# Patient Record
Sex: FEMALE | Race: WHITE | HISPANIC OR LATINO | Employment: FULL TIME | URBAN - METROPOLITAN AREA
[De-identification: names, ages, dates, MRNs, and addresses within clinical notes are randomized per-mention and may not be internally consistent; named-entity substitution may affect disease eponyms.]

---

## 2022-09-15 LAB — PAP SMEAR - HIM PATIENT REPORTED: NORMAL

## 2023-10-20 ENCOUNTER — TRANSFERRED RECORDS (OUTPATIENT)
Dept: MULTI SPECIALTY CLINIC | Facility: CLINIC | Age: 23
End: 2023-10-20

## 2023-10-20 LAB — CHLAMYDIA - HIM PATIENT REPORTED: NORMAL

## 2024-02-28 ENCOUNTER — OFFICE VISIT (OUTPATIENT)
Dept: FAMILY MEDICINE | Facility: CLINIC | Age: 24
End: 2024-02-28
Payer: COMMERCIAL

## 2024-02-28 VITALS
BODY MASS INDEX: 20.83 KG/M2 | SYSTOLIC BLOOD PRESSURE: 111 MMHG | RESPIRATION RATE: 16 BRPM | HEART RATE: 84 BPM | OXYGEN SATURATION: 100 % | DIASTOLIC BLOOD PRESSURE: 76 MMHG | WEIGHT: 125 LBS | HEIGHT: 65 IN | TEMPERATURE: 98.2 F

## 2024-02-28 DIAGNOSIS — Z86.19 HISTORY OF CHLAMYDIA: ICD-10-CM

## 2024-02-28 DIAGNOSIS — N89.8 VAGINAL DISCHARGE: ICD-10-CM

## 2024-02-28 DIAGNOSIS — Z11.3 SCREEN FOR STD (SEXUALLY TRANSMITTED DISEASE): ICD-10-CM

## 2024-02-28 DIAGNOSIS — Z11.59 NEED FOR HEPATITIS C SCREENING TEST: ICD-10-CM

## 2024-02-28 DIAGNOSIS — B96.89 BACTERIAL VAGINOSIS: ICD-10-CM

## 2024-02-28 DIAGNOSIS — Z00.00 ROUTINE GENERAL MEDICAL EXAMINATION AT A HEALTH CARE FACILITY: Primary | ICD-10-CM

## 2024-02-28 DIAGNOSIS — N76.0 BACTERIAL VAGINOSIS: ICD-10-CM

## 2024-02-28 LAB
CLUE CELLS: PRESENT
T PALLIDUM AB SER QL: NONREACTIVE
TRICHOMONAS, WET PREP: ABNORMAL
WBC'S/HIGH POWER FIELD, WET PREP: ABNORMAL
YEAST, WET PREP: ABNORMAL

## 2024-02-28 PROCEDURE — 99385 PREV VISIT NEW AGE 18-39: CPT | Performed by: PHYSICIAN ASSISTANT

## 2024-02-28 PROCEDURE — 99213 OFFICE O/P EST LOW 20 MIN: CPT | Mod: 25 | Performed by: PHYSICIAN ASSISTANT

## 2024-02-28 PROCEDURE — 86780 TREPONEMA PALLIDUM: CPT | Performed by: PHYSICIAN ASSISTANT

## 2024-02-28 PROCEDURE — 87210 SMEAR WET MOUNT SALINE/INK: CPT | Performed by: PHYSICIAN ASSISTANT

## 2024-02-28 PROCEDURE — 86803 HEPATITIS C AB TEST: CPT | Performed by: PHYSICIAN ASSISTANT

## 2024-02-28 PROCEDURE — 87591 N.GONORRHOEAE DNA AMP PROB: CPT | Performed by: PHYSICIAN ASSISTANT

## 2024-02-28 PROCEDURE — 87491 CHLMYD TRACH DNA AMP PROBE: CPT | Performed by: PHYSICIAN ASSISTANT

## 2024-02-28 PROCEDURE — 87389 HIV-1 AG W/HIV-1&-2 AB AG IA: CPT | Performed by: PHYSICIAN ASSISTANT

## 2024-02-28 PROCEDURE — 36415 COLL VENOUS BLD VENIPUNCTURE: CPT | Performed by: PHYSICIAN ASSISTANT

## 2024-02-28 RX ORDER — METRONIDAZOLE 7.5 MG/G
GEL VAGINAL
Qty: 25 G | Refills: 0 | Status: SHIPPED | OUTPATIENT
Start: 2024-02-28 | End: 2024-03-13

## 2024-02-28 SDOH — HEALTH STABILITY: PHYSICAL HEALTH: ON AVERAGE, HOW MANY DAYS PER WEEK DO YOU ENGAGE IN MODERATE TO STRENUOUS EXERCISE (LIKE A BRISK WALK)?: 3 DAYS

## 2024-02-28 ASSESSMENT — PAIN SCALES - GENERAL: PAINLEVEL: NO PAIN (0)

## 2024-02-28 ASSESSMENT — SOCIAL DETERMINANTS OF HEALTH (SDOH): HOW OFTEN DO YOU GET TOGETHER WITH FRIENDS OR RELATIVES?: THREE TIMES A WEEK

## 2024-02-28 NOTE — PATIENT INSTRUCTIONS
Preventive Care Advice   This is general advice given by our system to help you stay healthy. However, your care team may have specific advice just for you. Please talk to your care team about your preventive care needs.  Nutrition  Eat 5 or more servings of fruits and vegetables each day.  Try wheat bread, brown rice and whole grain pasta (instead of white bread, rice, and pasta).  Get enough calcium and vitamin D. Check the label on foods and aim for 100% of the RDA (recommended daily allowance).  Lifestyle  Exercise at least 150 minutes each week   (30 minutes a day, 5 days a week).  Do muscle strengthening activities 2 days a week. These help control your weight and prevent disease.  No smoking.  Wear sunscreen to prevent skin cancer.  Have a dental exam and cleaning every 6 months.  Yearly exams  See your health care team every year to talk about:  Any changes in your health.  Any medicines your care team has prescribed.  Preventive care, family planning, and ways to prevent chronic diseases.  Shots (vaccines)   HPV shots (up to age 26), if you've never had them before.  Hepatitis B shots (up to age 59), if you've never had them before.  COVID-19 shot: Get this shot when it's due.  Flu shot: Get a flu shot every year.  Tetanus shot: Get a tetanus shot every 10 years.  Pneumococcal, hepatitis A, and RSV shots: Ask your care team if you need these based on your risk.  Shingles shot (for age 50 and up).  General health tests  Diabetes screening:  Starting at age 35, Get screened for diabetes at least every 3 years.  If you are younger than age 35, ask your care team if you should be screened for diabetes.  Cholesterol test: At age 39, start having a cholesterol test every 5 years, or more often if advised.  Bone density scan (DEXA): At age 50, ask your care team if you should have this scan for osteoporosis (brittle bones).  Hepatitis C: Get tested at least once in your life.  STIs (sexually transmitted  infections)  Before age 24: Ask your care team if you should be screened for STIs.  After age 24: Get screened for STIs if you're at risk. You are at risk for STIs (including HIV) if:  You are sexually active with more than one person.  You don't use condoms every time.  You or a partner was diagnosed with a sexually transmitted infection.  If you are at risk for HIV, ask about PrEP medicine to prevent HIV.  Get tested for HIV at least once in your life, whether you are at risk for HIV or not.  Cancer screening tests  Cervical cancer screening: If you have a cervix, begin getting regular cervical cancer screening tests at age 21. Most people who have regular screenings with normal results can stop after age 65. Talk about this with your provider.  Breast cancer scan (mammogram): If you've ever had breasts, begin having regular mammograms starting at age 40. This is a scan to check for breast cancer.  Colon cancer screening: It is important to start screening for colon cancer at age 45.  Have a colonoscopy test every 10 years (or more often if you're at risk) Or, ask your provider about stool tests like a FIT test every year or Cologuard test every 3 years.  To learn more about your testing options, visit: https://www.Corpsolv/599176.pdf.  For help making a decision, visit: https://bit.ly/jq74549.  Prostate cancer screening test: If you have a prostate and are age 55 to 69, ask your provider if you would benefit from a yearly prostate cancer screening test.  Lung cancer screening: If you are a current or former smoker age 50 to 80, ask your care team if ongoing lung cancer screenings are right for you.  For informational purposes only. Not to replace the advice of your health care provider. Copyright   2023 [employer]. All rights reserved. Clinically reviewed by the [facility] Transitions Program. LitRes 574536 - REV 01/24.

## 2024-02-28 NOTE — PROGRESS NOTES
Preventive Care Visit  [facility]  WILVER East, Physician Assistant - Medical  Feb 28, 2024    Assessment & Plan     Routine general medical examination at a health care facility  Repeat 1 year     Vaginal discharge  - Wet prep - Clinic Collect  - Chlamydia trachomatis/Neisseria gonorrhoeae by PCR - Clinic Collect    Screen for STD (sexually transmitted disease)  - HIV Antigen Antibody Combo; Future  - Treponema Abs w Reflex to RPR and Titer; Future  - Chlamydia trachomatis/Neisseria gonorrhoeae by PCR - Clinic Collect  - HIV Antigen Antibody Combo  - Treponema Abs w Reflex to RPR and Titer    Need for hepatitis C screening test  - Hepatitis C Screen Reflex to HCV RNA Quant and Genotype; Future  - Hepatitis C Screen Reflex to HCV RNA Quant and Genotype    History of chlamydia  Rechecking today, has been treated twice in the last 6 months    Bacterial vaginosis  Wet prep positive for clue cells indicating bacterial vaginosis.  Medication sent to pharmacy.  Follow-up as needed  - metroNIDAZOLE (METROGEL) 0.75 % vaginal gel; Place one applicator in vagina at bedtime for 5 nights    Patient has been advised of split billing requirements and indicates understanding: Yes          Counseling  Appropriate preventive services were discussed with this patient, including applicable screening as appropriate for fall prevention, nutrition, physical activity, Tobacco-use cessation, weight loss and cognition.  Checklist reviewing preventive services available has been given to the patient.  Reviewed patient's diet, addressing concerns and/or questions.   She is at risk for lack of exercise and has been provided with information to increase physical activity for the benefit of her well-being.           Ilya Mancuso is a 23 year old, presenting for the following:  Physical (/)       Via the Health Maintenance questionnaire, the patient has reported the following services have been  completed -Chlamydia-Cervical Cancer Screening, this information has been sent to the abstraction team.  Health Care Directive  Patient does not have a Health Care Directive or Living Will: Discussed advance care planning with patient; however, patient declined at this time.    HPI    Concern - Vaginal   Description: Yeast/BV, STD testing.   Oct was positive for chlamydia. She was treated and retested in December which was normal. Then she had symptoms at the end of the December was positive again and was treated a second time for chlamydia.  Also over this timeframe she has noticed she has gotten yeast infections after completing treatment for chlamydia.  She still has some slight discomfort in the vaginal area.  She has more discharge than normal.  No abnormal odor or pain or bleeding.  Patient's last menstrual period was 02/20/2024 (approximate).  She usually does use condoms.          2/28/2024   General Health   How would you rate your overall physical health? Excellent   Feel stress (tense, anxious, or unable to sleep) Not at all         2/28/2024   Nutrition   Three or more servings of calcium each day? Yes   Diet: Regular (no restrictions)   How many servings of fruit and vegetables per day? (!) 0-1   How many sweetened beverages each day? 0-1         2/28/2024   Exercise   Days per week of moderate/strenous exercise 3 days         2/28/2024   Social Factors   Frequency of gathering with friends or relatives Three times a week   Worry food won't last until get money to buy more No   Food not last or not have enough money for food? No   Do you have housing?  Yes   Are you worried about losing your housing? No   Lack of transportation? No   Unable to get utilities (heat,electricity)? No         2/28/2024   Dental   Dentist two times every year? Yes         2/28/2024   TB Screening   Were you born outside of [place]?  No         Today's PHQ-2 Score:       2/28/2024     2:05 PM   PHQ-2 ( 1999 [employer])   Q1: Little  "interest or pleasure in doing things 0   Q2: Feeling down, depressed or hopeless 0   PHQ-2 Score 0   Q1: Little interest or pleasure in doing things Not at all   Q2: Feeling down, depressed or hopeless Not at all   PHQ-2 Score 0           2/28/2024   Substance Use   Alcohol more than 3/day or more than 7/wk No   Do you use any other substances recreationally? No     Social History     Tobacco Use    Smoking status: Never     Passive exposure: Never    Smokeless tobacco: Never   Vaping Use    Vaping Use: Never used   Substance Use Topics    Alcohol use: Never    Drug use: Never             2/28/2024   One time HIV Screening   Previous HIV test? Yes         2/28/2024   STI Screening   New sexual partner(s) since last STI/HIV test? No     History of abnormal Pap smear: NO - age 21-29 PAP every 3 years recommended             2/28/2024   Contraception/Family Planning   Questions about contraception or family planning No        Reviewed and updated as needed this visit by Provider                          Review of Systems  Constitutional, HEENT, cardiovascular, pulmonary, GI, , musculoskeletal, neuro, skin, endocrine and psych systems are negative, except as otherwise noted.     Objective    Exam  Temp 98.2  F (36.8  C) (Oral)   Resp 16   Ht 1.645 m (5' 4.76\")   Wt 56.7 kg (125 lb)   LMP 02/20/2024 (Approximate)   BMI 20.95 kg/m     Estimated body mass index is 20.95 kg/m  as calculated from the following:    Height as of this encounter: 1.645 m (5' 4.76\").    Weight as of this encounter: 56.7 kg (125 lb).    Physical Exam  GENERAL: alert and no distress  EYES: Eyes grossly normal to inspection, PERRL and conjunctivae and sclerae normal  HENT: ear canals and TM's normal, nose and mouth without ulcers or lesions  NECK: no adenopathy, no asymmetry, masses, or scars  RESP: lungs clear to auscultation - no rales, rhonchi or wheezes  CV: regular rate and rhythm, normal S1 S2, no S3 or S4, no murmur, click or rub, no " peripheral edema  ABDOMEN: soft, nontender, no hepatosplenomegaly, no masses and bowel sounds normal  MS: no gross musculoskeletal defects noted, no edema  SKIN: no suspicious lesions or rashes  NEURO: Normal strength and tone, mentation intact and speech normal  PSYCH: mentation appears normal, affect normal/bright      Signed Electronically by: WILVER East

## 2024-02-29 LAB
C TRACH DNA SPEC QL PROBE+SIG AMP: NEGATIVE
HCV AB SERPL QL IA: NONREACTIVE
HIV 1+2 AB+HIV1 P24 AG SERPL QL IA: NONREACTIVE
N GONORRHOEA DNA SPEC QL NAA+PROBE: NEGATIVE

## 2024-03-12 SDOH — HEALTH STABILITY: PHYSICAL HEALTH: ON AVERAGE, HOW MANY MINUTES DO YOU ENGAGE IN EXERCISE AT THIS LEVEL?: 0 MIN

## 2024-03-12 SDOH — HEALTH STABILITY: PHYSICAL HEALTH: ON AVERAGE, HOW MANY DAYS PER WEEK DO YOU ENGAGE IN MODERATE TO STRENUOUS EXERCISE (LIKE A BRISK WALK)?: 0 DAYS

## 2024-03-12 ASSESSMENT — SOCIAL DETERMINANTS OF HEALTH (SDOH): HOW OFTEN DO YOU GET TOGETHER WITH FRIENDS OR RELATIVES?: THREE TIMES A WEEK

## 2024-03-13 ENCOUNTER — OFFICE VISIT (OUTPATIENT)
Dept: FAMILY MEDICINE | Facility: CLINIC | Age: 24
End: 2024-03-13
Payer: COMMERCIAL

## 2024-03-13 VITALS
TEMPERATURE: 97.7 F | HEART RATE: 83 BPM | HEIGHT: 65 IN | WEIGHT: 124 LBS | RESPIRATION RATE: 18 BRPM | BODY MASS INDEX: 20.66 KG/M2 | SYSTOLIC BLOOD PRESSURE: 115 MMHG | DIASTOLIC BLOOD PRESSURE: 77 MMHG | OXYGEN SATURATION: 98 %

## 2024-03-13 DIAGNOSIS — N89.8 VAGINAL DISCHARGE: Primary | ICD-10-CM

## 2024-03-13 DIAGNOSIS — Z11.3 ENCNTR SCREEN FOR INFECTIONS W SEXL MODE OF TRANSMISS: ICD-10-CM

## 2024-03-13 LAB
CLUE CELLS: ABNORMAL
TRICHOMONAS, WET PREP: ABNORMAL
WBC'S/HIGH POWER FIELD, WET PREP: ABNORMAL
YEAST, WET PREP: ABNORMAL

## 2024-03-13 PROCEDURE — 87210 SMEAR WET MOUNT SALINE/INK: CPT | Performed by: PHYSICIAN ASSISTANT

## 2024-03-13 PROCEDURE — 99213 OFFICE O/P EST LOW 20 MIN: CPT | Performed by: PHYSICIAN ASSISTANT

## 2024-03-13 PROCEDURE — 87491 CHLMYD TRACH DNA AMP PROBE: CPT | Performed by: PHYSICIAN ASSISTANT

## 2024-03-13 PROCEDURE — 87591 N.GONORRHOEAE DNA AMP PROB: CPT | Performed by: PHYSICIAN ASSISTANT

## 2024-03-13 NOTE — PROGRESS NOTES
Preventive Care Visit  [facility]  Sandra Varma PA-C, Physician Assistant - Medical  Mar 13, 2024  {Provider  Link to SmartSet :797381}    {PROVIDER CHARTING PREFERENCE:020520}    Ilya Mancuso is a 23 year old, presenting for the following:  Physical        3/13/2024    11:31 AM   Additional Questions   Roomed by Cooper        Health Care Directive  Patient does not have a Health Care Directive or Living Will: Discussed advance care planning with patient; however, patient declined at this time.    HPI  ***  {MA/LPN/RN Pre-Provider Visit Orders- hCG/UA/Strep (Optional):124652}  {SUPERLIST (Optional):377496}  {additonal problems for provider to add (Optional):676793}      3/12/2024   General Health   How would you rate your overall physical health? Good   Feel stress (tense, anxious, or unable to sleep) Not at all         3/12/2024   Nutrition   Three or more servings of calcium each day? Yes   Diet: Regular (no restrictions)   How many servings of fruit and vegetables per day? (!) 0-1   How many sweetened beverages each day? 0-1         3/12/2024   Exercise   Days per week of moderate/strenous exercise 0 days   Average minutes spent exercising at this level 0 min   (!) EXERCISE CONCERN      3/12/2024   Social Factors   Frequency of gathering with friends or relatives Three times a week   Worry food won't last until get money to buy more No   Food not last or not have enough money for food? No   Do you have housing?  Yes   Are you worried about losing your housing? No   Lack of transportation? No   Unable to get utilities (heat,electricity)? No         3/12/2024   Dental   Dentist two times every year? Yes         2/28/2024   TB Screening   Were you born outside of [place]?  No       {Rooming Staff Patient needs a PHQ as part of the AWV.  Use this link to complete and then refresh the note to pull results Link to PHQ2 Assessment :550532}  {USE TO PULL IN PHQ RESULTS FOR  "TODAY:692709}      3/12/2024   Substance Use   Alcohol more than 3/day or more than 7/wk No   Do you use any other substances recreationally? No     Social History     Tobacco Use    Smoking status: Never     Passive exposure: Never    Smokeless tobacco: Never   Vaping Use    Vaping Use: Never used   Substance Use Topics    Alcohol use: Never    Drug use: Never     {Provider  If there are gaps in the social history shown above, please follow the link to update and then refresh the note Link to Social and Substance History :553924}      3/12/2024   STI Screening   New sexual partner(s) since last STI/HIV test? No     History of abnormal Pap smear: { :651860}             3/12/2024   Contraception/Family Planning   Questions about contraception or family planning No     {Provider  Use the storyboard to review patient history, after sections have been marked as reviewed, refresh note to capture documentation:539389}   Reviewed and updated as needed this visit by Provider                    {HISTORY OPTIONS (Optional):392416}    {ROS Picklists (Optional):371557}     Objective    Exam  /77 (BP Location: Right arm, Patient Position: Sitting, Cuff Size: Adult Large)   Pulse 83   Temp 97.7  F (36.5  C) (Temporal)   Resp 18   Ht 1.65 m (5' 4.96\")   Wt 56.2 kg (124 lb)   LMP 02/20/2024 (Approximate)   SpO2 98%   BMI 20.66 kg/m     Estimated body mass index is 20.66 kg/m  as calculated from the following:    Height as of this encounter: 1.65 m (5' 4.96\").    Weight as of this encounter: 56.2 kg (124 lb).    Physical Exam  {Exam Choices (Optional):026241}        Signed Electronically by: Sandra Varma PA-C  {Email feedback regarding this note to [email or web address]   :360514}  "

## 2024-03-13 NOTE — COMMUNITY RESOURCES LIST (ENGLISH)
March 13, 2024           YOUR PERSONALIZED LIST OF SERVICES & PROGRAMS           & RECREATION    [facility]      [facility] [place] - Sports clubs and recreational activities  [address] [place] [address] (Distance: 1.4 miles)  Language: English  Fee: Free, Self pay  Accessibility: [place] accessible      [place] [facility]  [address] [place] [address] (Distance: 0.6 miles)  Phone: [phone]  Website: https://www.Nutrabolt/fitness/  Language: English      [employer]  Website: http://www.Hexagram 49.Cruise Compare    Classes/Groups      [facility] [place] - Gym or workout facility - [place] [facility]  [address] [place] [address] (Distance: 0.5 miles)  Language: English  Fee: Free, Self pay      [facility] [place] - Group fitness classes - [place] [facility]  [address] [place] [address] (Distance: 0.5 miles)  Phone: [phone]  Language: English  Fee: Free, Self pay  Accessibility: [place] accessible      Workouts - Exercise Class/At Home Workouts  Website: https://homeworkouts.org/  Language: English               IMPORTANT NUMBERS & WEBSITES        Emergency Services  911  .   United Way  211 http://211unitedway.org  .   Poison Control  (324) 359-8232 http://[place]poison.org http://wisconsinpoison.org  .     Suicide and Crisis Lifeline  988 http://988lifeline.org  .   Childhelp National Child Abuse Hotline  435.573.2098 http://Childhelphotline.org   .   National Sexual Assault Hotline  (695) 757-4176 (HOPE) http://Rainn.org   .     National Runaway Safeline  (184) 117-6633 (RUNAWAY) http://1800runaway.org  .   Pregnancy & Postpartum Support  Call/text 121-476-0557  [place]: http://ppsupportmn.org  [place]: http://NurseLiability.com.com/wi  .   Substance Abuse National Helpline ([facility])  328-147-HELP (2626) http://Findtreatment.gov   .                DISCLAIMER: [employer] does not endorse any service providers  mentioned in this resource list. [employer] does not guarantee that the services mentioned in this resource list will be available to you or will improve your health or wellness.    [facility]

## 2024-03-13 NOTE — PATIENT INSTRUCTIONS
Recurrent BV treatment with Boric Acid:    -To prevent BV: insert 1 capsule vaginally twice a week at nighttime for 12-16 weeks    *SUPPOSITORIES ARE VAGINAL ONLY - DO NOT TAKE ORALLY    *If sexually active, wait 1 hr after inserting suppository before having sex OR have sex and then insert suppository    -Purchase pre-made boric acid suppositories OR make them yourself:    *Recommend Amazon to purchase pre-made capsules (600mg) - costs approximately $20 for 30 capsules  *Also available at [employer], typically 10-15 capsules per box    *To make them yourself: fill size 00 gelatin capsules (purchased at [employer] or [employer]) with boric acid powder (oroeco, some [facility]) - I recommend being careful to not digest/inhale the powder - costs approximately $10-$20 for 100+ capsules

## 2024-03-13 NOTE — PROGRESS NOTES
"  Assessment & Plan     Vaginal discharge  - Wet prep - Clinic Collect    Encntr screen for infections w sexl mode of transmiss  - Chlamydia trachomatis/Neisseria gonorrhoeae by PCR - Clinic Collect    Counseling  Appropriate preventive services were discussed with this patient, including applicable screening as appropriate for fall prevention, nutrition, physical activity, Tobacco-use cessation, weight loss and cognition.  Checklist reviewing preventive services available has been given to the patient.  Reviewed patient's diet, addressing concerns and/or questions.         Subjective   Jamee is a 23 year old, presenting for the following health issues:  Yeast infection      3/13/2024    11:31 AM   Additional Questions   Roomed by Cooper     Recent tx for BV with metro gel  Symptoms didn't really seem to resolve, now having vaginal burning and increased thick discharge  Has been back and forth with BV & yeast since tx for CT     HPI         Objective    /77 (BP Location: Right arm, Patient Position: Sitting, Cuff Size: Adult Large)   Pulse 83   Temp 97.7  F (36.5  C) (Temporal)   Resp 18   Ht 1.65 m (5' 4.96\")   Wt 56.2 kg (124 lb)   LMP 02/20/2024 (Approximate)   SpO2 98%   BMI 20.66 kg/m    Body mass index is 20.66 kg/m .  Physical Exam   GENERAL: alert and no distress   (female): External genitalia normal, urethra normal, vagina normal with copious white creamy discharge, cervix normal without lesions. Wet prep collected.     Signed Electronically by: Sandra Varma PA-C    "

## 2024-03-14 LAB
C TRACH DNA SPEC QL PROBE+SIG AMP: NEGATIVE
N GONORRHOEA DNA SPEC QL NAA+PROBE: NEGATIVE

## 2024-03-18 ENCOUNTER — OFFICE VISIT (OUTPATIENT)
Dept: FAMILY MEDICINE | Facility: CLINIC | Age: 24
End: 2024-03-18
Payer: COMMERCIAL

## 2024-03-18 VITALS
OXYGEN SATURATION: 100 % | DIASTOLIC BLOOD PRESSURE: 79 MMHG | WEIGHT: 123 LBS | HEART RATE: 99 BPM | BODY MASS INDEX: 20.49 KG/M2 | HEIGHT: 65 IN | RESPIRATION RATE: 15 BRPM | TEMPERATURE: 98 F | SYSTOLIC BLOOD PRESSURE: 118 MMHG

## 2024-03-18 DIAGNOSIS — R10.84 ABDOMINAL PAIN, GENERALIZED: Primary | ICD-10-CM

## 2024-03-18 LAB
ALBUMIN SERPL BCG-MCNC: 4.8 G/DL (ref 3.5–5.2)
ALBUMIN UR-MCNC: NEGATIVE MG/DL
ALP SERPL-CCNC: 81 U/L (ref 40–150)
ALT SERPL W P-5'-P-CCNC: 12 U/L (ref 0–50)
ANION GAP SERPL CALCULATED.3IONS-SCNC: 11 MMOL/L (ref 7–15)
APPEARANCE UR: CLEAR
AST SERPL W P-5'-P-CCNC: 19 U/L (ref 0–45)
BACTERIA #/AREA URNS HPF: ABNORMAL /HPF
BILIRUB SERPL-MCNC: 0.4 MG/DL
BILIRUB UR QL STRIP: NEGATIVE
BUN SERPL-MCNC: 10.1 MG/DL (ref 6–20)
CALCIUM SERPL-MCNC: 9.4 MG/DL (ref 8.6–10)
CHLORIDE SERPL-SCNC: 105 MMOL/L (ref 98–107)
COLOR UR AUTO: YELLOW
CREAT SERPL-MCNC: 0.62 MG/DL (ref 0.51–0.95)
DEPRECATED HCO3 PLAS-SCNC: 24 MMOL/L (ref 22–29)
EGFRCR SERPLBLD CKD-EPI 2021: >90 ML/MIN/1.73M2
ERYTHROCYTE [DISTWIDTH] IN BLOOD BY AUTOMATED COUNT: 12.9 % (ref 10–15)
GLUCOSE SERPL-MCNC: 81 MG/DL (ref 70–99)
GLUCOSE UR STRIP-MCNC: NEGATIVE MG/DL
HCT VFR BLD AUTO: 42.6 % (ref 35–47)
HGB BLD-MCNC: 13.5 G/DL (ref 11.7–15.7)
HGB UR QL STRIP: ABNORMAL
KETONES UR STRIP-MCNC: 15 MG/DL
LEUKOCYTE ESTERASE UR QL STRIP: NEGATIVE
MCH RBC QN AUTO: 27.2 PG (ref 26.5–33)
MCHC RBC AUTO-ENTMCNC: 31.7 G/DL (ref 31.5–36.5)
MCV RBC AUTO: 86 FL (ref 78–100)
MUCOUS THREADS #/AREA URNS LPF: PRESENT /LPF
NITRATE UR QL: NEGATIVE
PH UR STRIP: 6 [PH] (ref 5–7)
PLATELET # BLD AUTO: 267 10E3/UL (ref 150–450)
POTASSIUM SERPL-SCNC: 4.2 MMOL/L (ref 3.4–5.3)
PROT SERPL-MCNC: 7.9 G/DL (ref 6.4–8.3)
RBC # BLD AUTO: 4.97 10E6/UL (ref 3.8–5.2)
RBC #/AREA URNS AUTO: ABNORMAL /HPF
SODIUM SERPL-SCNC: 140 MMOL/L (ref 135–145)
SP GR UR STRIP: 1.02 (ref 1–1.03)
SQUAMOUS #/AREA URNS AUTO: ABNORMAL /LPF
UROBILINOGEN UR STRIP-ACNC: 0.2 E.U./DL
WBC # BLD AUTO: 7.8 10E3/UL (ref 4–11)
WBC #/AREA URNS AUTO: ABNORMAL /HPF

## 2024-03-18 PROCEDURE — 36415 COLL VENOUS BLD VENIPUNCTURE: CPT | Performed by: NURSE PRACTITIONER

## 2024-03-18 PROCEDURE — 99214 OFFICE O/P EST MOD 30 MIN: CPT | Performed by: NURSE PRACTITIONER

## 2024-03-18 PROCEDURE — 81001 URINALYSIS AUTO W/SCOPE: CPT | Performed by: NURSE PRACTITIONER

## 2024-03-18 PROCEDURE — 80053 COMPREHEN METABOLIC PANEL: CPT | Performed by: NURSE PRACTITIONER

## 2024-03-18 PROCEDURE — 85027 COMPLETE CBC AUTOMATED: CPT | Performed by: NURSE PRACTITIONER

## 2024-03-18 RX ORDER — SACCHAROMYCES BOULARDII 250 MG
250 CAPSULE ORAL 2 TIMES DAILY
Qty: 60 CAPSULE | Refills: 0 | Status: SHIPPED | OUTPATIENT
Start: 2024-03-18 | End: 2024-04-17

## 2024-03-18 ASSESSMENT — PAIN SCALES - GENERAL: PAINLEVEL: NO PAIN (0)

## 2024-03-18 NOTE — PROGRESS NOTES
Assessment & Plan     Abdominal pain, generalized  No red flags on exam; no abdominal tenderness, guarding, rebound tenderness of powers sign on exam; appears well in clinic; hemodynamically stable w/o fever; discuss ?gastroenteritis r/t multiple antibiotic/antiviral treatment for STI, BV, yeast and cystitis over the past 1-2 months; continue bland diet; stool/abdominal diary of food related changes; start probiotic for 3 weeks for gut health; check H pylori, electrolytes, white count ; UA negative; follow up in 2-3 weeks if no improvement or worsening symptoms; menses started today.     - Wet prep - lab collect  - UA Macroscopic with reflex to Microscopic and Culture - Lab Collect  - UA Macroscopic with reflex to Microscopic and Culture - Lab Collect  - UA Microscopic with Reflex to Culture  - saccharomyces boulardii (FLORASTOR) 250 MG capsule  Dispense: 60 capsule; Refill: 0  - CBC with platelets  - Comprehensive metabolic panel  - Helicobacter pylori Antigen Stool  - CBC with platelets  - Comprehensive metabolic panel  - Helicobacter pylori Antigen Stool                    Ilya Mancuso is a 23 year old, presenting for the following health issues:  Abdominal Pain (Stomach / abdomin pain - for the last week ) and UTI (Pain When Peeing )        3/18/2024     1:20 PM   Additional Questions   Roomed by Sandra Salter     History of Present Illness       Back Pain:  She presents for follow up of back pain. Patient's back pain is a new problem.    Original cause of back pain: not sure  First noticed back pain: in the last week  Patient feels back pain: dailyLocation of back pain:  Right side of waist and left side of waist  Description of back pain: cramping, dull ache and sharp  Back pain spreads: nowhere    Since patient first noticed back pain, pain is: unchanged  Does back pain interfere with her job:  Not applicable  On a scale of 1-10 (10 being the worst), patient describes pain as:  4  What makes back  pain worse: nothing   Acupuncture: not tried  Acetaminophen: not helpful  Activity or exercise: not helpful  Chiropractor:  Not tried  Cold: not helpful  Heat: not helpful  Massage: not tried  Muscle relaxants: not tried  NSAIDS: not tried  Opioids: not tried  Physical Therapy: not tried  Rest: not helpful  Steroid Injection: not tried  Stretching: not tried  Surgery: not tried  TENS unit: not tried  Topical pain relievers: not tried  Other healthcare providers patient is seeing for back pain: Other    Reason for visit:  Stomach and back pain  Symptom onset:  3-7 days ago  Symptoms include:  Cramping and hurts to pee  Symptom intensity:  Moderate  Symptom progression:  Staying the same  Had these symptoms before:  No  What makes it worse:  Eating or drinking  What makes it better:  Not eating or drinking    She eats 0-1 servings of fruits and vegetables daily.She consumes 1 sweetened beverage(s) daily.She exercises with enough effort to increase her heart rate 20 to 29 minutes per day.  She exercises with enough effort to increase her heart rate 3 or less days per week.   She is taking medications regularly.     # abdominal pain after eating last several hours epigastric regions; radiating into bilateral upper quads and back;  had mucus bowel movement x 2 on Wednesday and Thursday; no hematochezia, +  nausea; + chills after eating; last meal 10 pm last evening; eating a bland diet; no taking any over the counter medications; recently treated for yeast and BV x2 once with oral and mostly treated with vaginal cream; diflucan 150 mg x 2 tabs; did not start boric acid; treated for chlamydia doxycycline in January ; keeping food and liquids down; no diarrhea; denies otc nsaids ; + dysuria, frequency and small amount; denies hematuria ; no vaginal discharge, odor, AUB                  Objective    /79 (BP Location: Right arm, Patient Position: Sitting, Cuff Size: Adult Regular)   Pulse 99   Temp 98  F (36.7  C)  "(Temporal)   Resp 15   Ht 1.651 m (5' 5\")   Wt 55.8 kg (123 lb)   LMP 02/20/2024 (Approximate)   SpO2 100%   BMI 20.47 kg/m    Body mass index is 20.47 kg/m .  Physical Exam   GENERAL: alert and no distress  NECK: no adenopathy, no asymmetry, masses, or scars  CV: regular rate and rhythm, normal S1 S2, no S3 or S4, no murmur, click or rub, no peripheral edema  ABDOMEN: soft, nontender, no hepatosplenomegaly, no masses and bowel sounds normal            Signed Electronically by: GALLO Corrales CNP    "

## 2024-03-19 ENCOUNTER — APPOINTMENT (OUTPATIENT)
Dept: LAB | Facility: CLINIC | Age: 24
End: 2024-03-19
Payer: COMMERCIAL

## 2024-03-19 PROCEDURE — 87338 HPYLORI STOOL AG IA: CPT | Performed by: NURSE PRACTITIONER

## 2024-03-20 LAB — H PYLORI AG STL QL IA: NEGATIVE

## 2024-03-27 NOTE — RESULT ENCOUNTER NOTE
Results discussed directly with patient while patient was present. Any further details documented in the note.   GALLO Corrales CNP

## 2024-05-15 ENCOUNTER — OFFICE VISIT (OUTPATIENT)
Dept: FAMILY MEDICINE | Facility: CLINIC | Age: 24
End: 2024-05-15
Payer: COMMERCIAL

## 2024-05-15 VITALS
DIASTOLIC BLOOD PRESSURE: 78 MMHG | BODY MASS INDEX: 20.09 KG/M2 | OXYGEN SATURATION: 99 % | WEIGHT: 125 LBS | TEMPERATURE: 97.8 F | HEIGHT: 66 IN | SYSTOLIC BLOOD PRESSURE: 113 MMHG | RESPIRATION RATE: 18 BRPM | HEART RATE: 78 BPM

## 2024-05-15 DIAGNOSIS — N89.8 VAGINAL DISCHARGE: ICD-10-CM

## 2024-05-15 DIAGNOSIS — Z11.3 SCREENING EXAMINATION FOR VENEREAL DISEASE: Primary | ICD-10-CM

## 2024-05-15 LAB
CLUE CELLS: PRESENT
TRICHOMONAS, WET PREP: ABNORMAL
WBC'S/HIGH POWER FIELD, WET PREP: ABNORMAL
YEAST, WET PREP: ABNORMAL

## 2024-05-15 PROCEDURE — 99213 OFFICE O/P EST LOW 20 MIN: CPT | Performed by: FAMILY MEDICINE

## 2024-05-15 PROCEDURE — 87591 N.GONORRHOEAE DNA AMP PROB: CPT | Performed by: FAMILY MEDICINE

## 2024-05-15 PROCEDURE — 87210 SMEAR WET MOUNT SALINE/INK: CPT | Performed by: FAMILY MEDICINE

## 2024-05-15 PROCEDURE — 87491 CHLMYD TRACH DNA AMP PROBE: CPT | Performed by: FAMILY MEDICINE

## 2024-05-15 NOTE — PATIENT INSTRUCTIONS
Safer Sex: Care Instructions  Overview  Safer sex is a way to reduce your risk of getting a sexually transmitted infection (STI). It can also help prevent pregnancy.  Several products can help you practice safer sex and reduce your chance of STIs. One of the best is a condom. There are internal and external condoms. You can use a special rubber sheet (dental dam) for protection during oral sex. Disposable gloves can keep your hands from touching blood, semen, or other body fluids that can carry infections.  Remember that birth control methods such as diaphragms, IUDs, foams, and birth control pills do not stop you from getting STIs.  Follow-up care is a key part of your treatment and safety. Be sure to make and go to all appointments, and call your doctor if you are having problems. It's also a good idea to know your test results and keep a list of the medicines you take.  How can you care for yourself at home?  Think about getting vaccinated to help prevent hepatitis A, hepatitis B, and human papillomavirus (HPV). They can be spread through sex.  Use a condom every time you have sex. Use an external condom, which goes on the penis. Or use an internal condom, which goes into the vagina or anus.  Make sure you use the right size external condom. A condom that's too small can break easily. A condom that's too big can slip off during sex.  Use a new condom each time you have sex. Be careful not to poke a hole in the condom when you open the wrapper.  Don't use an internal condom and an external condom at the same time.  Never use petroleum jelly (such as Vaseline), grease, hand lotion, baby oil, or anything with oil in it. These products can make holes in the condom.  After intercourse, hold the edge of the condom as you remove it. This will help keep semen from spilling out of the condom.  Do not have sex with anyone who has symptoms of an STI, such as sores on the genitals or mouth.  Do not drink a lot of alcohol or  "use drugs before sex.  Limit your sex partners. Sex with one partner who has sex only with you can reduce your risk of getting an STI.  Don't share sex toys. But if you do share them, use a condom and clean the sex toys between each use.  Talk to any partners before you have sex. Talk about what you feel comfortable with and whether you have any boundaries with sex. And find out if your partner or partners may be at risk for any STI. Keep in mind that a person may be able to spread an STI even if they do not have symptoms. You and any partners may want to get tested for STIs.  Where can you learn more?  Go to https://www.[employer].net/patiented  Enter B608 in the search box to learn more about \"Safer Sex: Care Instructions.\"  Current as of: November 27, 2023               Content Version: 14.0    1625-5724 [employer].   Care instructions adapted under license by your healthcare professional. If you have questions about a medical condition or this instruction, always ask your healthcare professional. [employer], [employer] disclaims any warranty or liability for your use of this information.      "

## 2024-05-15 NOTE — PROGRESS NOTES
"  Assessment & Plan     Screening examination for venereal disease  Vaginal discharge  -Whitish vaginal discharge and itching last few days  -Discussed wet prep and GC/Chl testing  -Pt declined other STI screening -HIV and syphilis  - Vagina-Wet preparation  - First-voided urine - Chlamydia trachomatis/Neisseria gonorrhoeae by PCR    Ilya Mancuso is a 23 year old, presenting for the following health issues:  STD ( BV test )    History of Present Illness       Reason for visit:  Lab test    She eats 0-1 servings of fruits and vegetables daily.She consumes 1 sweetened beverage(s) daily.She exercises with enough effort to increase her heart rate 30 to 60 minutes per day.  She exercises with enough effort to increase her heart rate 4 days per week.   She is taking medications regularly.     Discharge and itching, white. 3 days of symptoms. Thinks she may have BV or yeast infection.  Sexually active, one male partner. Would also like GC/Chlamydia screening.                  Objective    /78 (BP Location: Right arm, Patient Position: Sitting, Cuff Size: Adult Regular)   Pulse 78   Temp 97.8  F (36.6  C) (Temporal)   Resp 18   Ht 1.67 m (5' 5.75\")   Wt 56.7 kg (125 lb)   LMP 04/11/2024   SpO2 99%   BMI 20.33 kg/m    Body mass index is 20.33 kg/m .  Physical Exam   GENERAL: alert and no distress            Signed Electronically by: Madhav Burks DO    "

## 2024-05-16 DIAGNOSIS — B96.89 BACTERIAL VAGINOSIS: Primary | ICD-10-CM

## 2024-05-16 DIAGNOSIS — N76.0 BACTERIAL VAGINOSIS: Primary | ICD-10-CM

## 2024-05-16 LAB
C TRACH DNA SPEC QL PROBE+SIG AMP: NEGATIVE
N GONORRHOEA DNA SPEC QL NAA+PROBE: NEGATIVE

## 2024-05-16 RX ORDER — METRONIDAZOLE 500 MG/1
500 TABLET ORAL 2 TIMES DAILY
Qty: 14 TABLET | Refills: 0 | Status: SHIPPED | OUTPATIENT
Start: 2024-05-16 | End: 2024-05-24

## 2024-05-22 ENCOUNTER — OFFICE VISIT (OUTPATIENT)
Dept: FAMILY MEDICINE | Facility: CLINIC | Age: 24
End: 2024-05-22
Payer: COMMERCIAL

## 2024-05-22 VITALS
DIASTOLIC BLOOD PRESSURE: 80 MMHG | WEIGHT: 125 LBS | OXYGEN SATURATION: 99 % | RESPIRATION RATE: 16 BRPM | SYSTOLIC BLOOD PRESSURE: 114 MMHG | HEART RATE: 79 BPM | HEIGHT: 66 IN | BODY MASS INDEX: 20.09 KG/M2 | TEMPERATURE: 99.5 F

## 2024-05-22 DIAGNOSIS — Z11.3 SCREEN FOR STD (SEXUALLY TRANSMITTED DISEASE): ICD-10-CM

## 2024-05-22 DIAGNOSIS — B37.31 YEAST INFECTION OF THE VAGINA: ICD-10-CM

## 2024-05-22 DIAGNOSIS — R30.0 DYSURIA: Primary | ICD-10-CM

## 2024-05-22 LAB
ALBUMIN UR-MCNC: NEGATIVE MG/DL
APPEARANCE UR: CLEAR
BACTERIA #/AREA URNS HPF: ABNORMAL /HPF
BILIRUB UR QL STRIP: NEGATIVE
COLOR UR AUTO: YELLOW
GLUCOSE UR STRIP-MCNC: NEGATIVE MG/DL
HGB UR QL STRIP: ABNORMAL
KETONES UR STRIP-MCNC: NEGATIVE MG/DL
LEUKOCYTE ESTERASE UR QL STRIP: ABNORMAL
NITRATE UR QL: NEGATIVE
PH UR STRIP: 6 [PH] (ref 5–8)
RBC #/AREA URNS AUTO: ABNORMAL /HPF
SP GR UR STRIP: <=1.005 (ref 1–1.03)
SQUAMOUS #/AREA URNS AUTO: ABNORMAL /LPF
T PALLIDUM AB SER QL: NONREACTIVE
UROBILINOGEN UR STRIP-ACNC: 0.2 E.U./DL
WBC #/AREA URNS AUTO: ABNORMAL /HPF

## 2024-05-22 PROCEDURE — 87389 HIV-1 AG W/HIV-1&-2 AB AG IA: CPT | Performed by: FAMILY MEDICINE

## 2024-05-22 PROCEDURE — 87491 CHLMYD TRACH DNA AMP PROBE: CPT | Performed by: FAMILY MEDICINE

## 2024-05-22 PROCEDURE — 99214 OFFICE O/P EST MOD 30 MIN: CPT | Performed by: FAMILY MEDICINE

## 2024-05-22 PROCEDURE — 87086 URINE CULTURE/COLONY COUNT: CPT | Performed by: FAMILY MEDICINE

## 2024-05-22 PROCEDURE — 36415 COLL VENOUS BLD VENIPUNCTURE: CPT | Performed by: FAMILY MEDICINE

## 2024-05-22 PROCEDURE — 87591 N.GONORRHOEAE DNA AMP PROB: CPT | Performed by: FAMILY MEDICINE

## 2024-05-22 PROCEDURE — 86706 HEP B SURFACE ANTIBODY: CPT | Performed by: FAMILY MEDICINE

## 2024-05-22 PROCEDURE — 81001 URINALYSIS AUTO W/SCOPE: CPT | Performed by: FAMILY MEDICINE

## 2024-05-22 PROCEDURE — 86803 HEPATITIS C AB TEST: CPT | Performed by: FAMILY MEDICINE

## 2024-05-22 PROCEDURE — 86780 TREPONEMA PALLIDUM: CPT | Performed by: FAMILY MEDICINE

## 2024-05-22 PROCEDURE — 87186 SC STD MICRODIL/AGAR DIL: CPT | Performed by: FAMILY MEDICINE

## 2024-05-22 RX ORDER — FLUCONAZOLE 150 MG/1
150 TABLET ORAL ONCE
Qty: 1 TABLET | Refills: 0 | Status: SHIPPED | OUTPATIENT
Start: 2024-05-22 | End: 2024-05-22

## 2024-05-22 NOTE — PROGRESS NOTES
Assessment & Plan     (R30.0) Dysuria  (primary encounter diagnosis)  Comment: pt has dysuria for 4 days with foul smell. Cloudy urine. No blood in urine, no abd pain, flank pain and fever. UA: not clean catch likely contaminated. Urine culture is pending. Exam is not remarkable. likely she has vaginal yeast infection to cause dysuria.   Plan: UA Macroscopic with reflex to Microscopic and         Culture - Lab Collect, Urine Microscopic Exam,         Urine Culture        Will follow-up urine culture. Advise to push water.     (B37.31) Yeast infection of the vagina  Comment: pt has vaginal discharge as white cheese like for several days. Reviewed the chart pt had bv and she is taking flagyl. Denies fever, abd pain. Likely vaginal yeast infection.   Plan: fluconazole (DIFLUCAN) 150 MG tablet        Will treated as vaginal yeast infection.     (Z11.3) Screen for STD (sexually transmitted disease)  Comment: pt had gcc checked one week ago. She uses condom. She want to do gcc today too.   Plan: Chlamydia & Gonorrhea by PCR, [facility]/Range -         Clinic Collect, Hepatitis C Screen Reflex to         HCV RNA Quant and Genotype, Hepatitis B Surface        Antibody, HIV Antigen Antibody Combo, Treponema        Abs w Reflex to RPR and Titer        Std prevention addressed.              Ilya Mancuso is a 23 year old, presenting for the following health issues:  Vaginal Problem (Burning during urination, foul urine smell, vaginal itching- pt just started flagyl for BV on 5/20/2024; also requesting additional STD testing)        5/22/2024     2:18 PM   Additional Questions   Roomed by Gen ANGELY CMA     Vaginal Problem     History of Present Illness       Reason for visit:  Lab test    She eats 0-1 servings of fruits and vegetables daily.She consumes 1 sweetened beverage(s) daily.She exercises with enough effort to increase her heart rate 30 to 60 minutes per day.  She exercises with enough effort to increase her heart  "rate 4 days per week.   She is taking medications regularly.            Review of Systems  Constitutional, HEENT, cardiovascular, pulmonary, gi   systems are negative, except as otherwise noted.      Objective    /80 (BP Location: Left arm, Patient Position: Sitting)   Pulse 79   Temp 99.5  F (37.5  C) (Oral)   Resp 16   Ht 1.67 m (5' 5.75\")   Wt 56.7 kg (125 lb)   LMP 05/15/2024   SpO2 99%   Breastfeeding No   BMI 20.33 kg/m    Body mass index is 20.33 kg/m .  Physical Exam   GENERAL: alert and no distress  NECK: no adenopathy, no asymmetry, masses, or scars  RESP: lungs clear to auscultation - no rales, rhonchi or wheezes  CV: regular rate and rhythm, normal S1 S2, no S3 or S4, no murmur, click or rub, no peripheral edema  ABDOMEN: soft, nontender, no hepatosplenomegaly, no masses and bowel sounds normal  MS: no gross musculoskeletal defects noted, no edema           Signed Electronically by: Socorro Awan MD    "

## 2024-05-23 LAB
C TRACH DNA SPEC QL PROBE+SIG AMP: NEGATIVE
HBV SURFACE AB SERPL IA-ACNC: 5.32 M[IU]/ML
HBV SURFACE AB SERPL IA-ACNC: NONREACTIVE M[IU]/ML
HCV AB SERPL QL IA: NONREACTIVE
HIV 1+2 AB+HIV1 P24 AG SERPL QL IA: NONREACTIVE
N GONORRHOEA DNA SPEC QL NAA+PROBE: NEGATIVE

## 2024-05-24 ENCOUNTER — OFFICE VISIT (OUTPATIENT)
Dept: FAMILY MEDICINE | Facility: CLINIC | Age: 24
End: 2024-05-24
Payer: COMMERCIAL

## 2024-05-24 VITALS
HEART RATE: 116 BPM | HEIGHT: 65 IN | OXYGEN SATURATION: 97 % | BODY MASS INDEX: 20.51 KG/M2 | WEIGHT: 123.1 LBS | TEMPERATURE: 98.6 F | SYSTOLIC BLOOD PRESSURE: 129 MMHG | RESPIRATION RATE: 16 BRPM | DIASTOLIC BLOOD PRESSURE: 72 MMHG

## 2024-05-24 DIAGNOSIS — N89.8 VAGINAL DISCHARGE: Primary | ICD-10-CM

## 2024-05-24 DIAGNOSIS — N89.8 VAGINAL LESION: ICD-10-CM

## 2024-05-24 DIAGNOSIS — N30.00 ACUTE CYSTITIS WITHOUT HEMATURIA: Primary | ICD-10-CM

## 2024-05-24 LAB
BACTERIA UR CULT: ABNORMAL
CLUE CELLS: ABNORMAL
TRICHOMONAS, WET PREP: ABNORMAL
WBC'S/HIGH POWER FIELD, WET PREP: ABNORMAL
YEAST, WET PREP: ABNORMAL

## 2024-05-24 PROCEDURE — 87210 SMEAR WET MOUNT SALINE/INK: CPT

## 2024-05-24 PROCEDURE — G2211 COMPLEX E/M VISIT ADD ON: HCPCS

## 2024-05-24 PROCEDURE — 80053 COMPREHEN METABOLIC PANEL: CPT

## 2024-05-24 PROCEDURE — 99213 OFFICE O/P EST LOW 20 MIN: CPT

## 2024-05-24 PROCEDURE — 87529 HSV DNA AMP PROBE: CPT

## 2024-05-24 PROCEDURE — 36415 COLL VENOUS BLD VENIPUNCTURE: CPT

## 2024-05-24 RX ORDER — SULFAMETHOXAZOLE/TRIMETHOPRIM 800-160 MG
1 TABLET ORAL 2 TIMES DAILY
Qty: 6 TABLET | Refills: 0 | Status: SHIPPED | OUTPATIENT
Start: 2024-05-24 | End: 2024-05-27

## 2024-05-24 ASSESSMENT — PAIN SCALES - GENERAL: PAINLEVEL: NO PAIN (0)

## 2024-05-24 NOTE — PROGRESS NOTES
Assessment & Plan     (N89.8) Vaginal discharge  (primary encounter diagnosis)  Comment: Acute and stable.  No findings that would warrant the need for immediate medical attention.  Wet prep help to rule out concerns for yeast and ongoing BV.  Patient still needs treatment for her urinary tract infection, also reviewed results from previous visit, and reeducated her on antibiotic dosing, possible side effects, and adverse effects to be aware of.  Also reeducated her on symptoms to look out for that would warrant the need to begin the Diflucan for possible yeast infection.  Educated her to hold off on the Diflucan at this time since she does not currently have an active yeast infection. Education given on return to clinic instructions as well as alarm signs that would require the need for immediate medical attention.  Patient attested to understanding.  Plan: Wet prep - Clinic Collect, Comprehensive         metabolic panel (BMP + Alb, Alk Phos, ALT, AST,        Total. Bili, TP)    (N89.8) Vaginal lesion  Comment: Acute and stable.  No concerns for severe cellulitis or findings that would warrant the need for immediate medical attention.  Differential diagnoses include HSV versus ingrown hair.  Swabbing the lesion today to rule out need for antiviral medication.  Will get a CMP today to ensure liver health if antivirals are necessary.  Educated patient on treatment course if HSV was positive, and educated her that I would follow-up based on lab results  Plan: HSV Types 1 and 2 Qualitative PCR CSF,         Comprehensive metabolic panel (BMP + Alb, Alk         Phos, ALT, AST, Total. Bili, TP)       Ordering of each unique test  Prescription drug management  I spent a total of 16 minutes on the day of the visit.   Time spent by me doing chart review, history and exam, documentation and further activities per the note    FUTURE APPOINTMENTS:       - Follow-up visit in 1 week if symptoms worsen or do not improve        -  Follow-up for annual visit or as needed  Patient Instructions   we will repeat your wet prep today to ensure that your ongoing vaginal symptoms are not related to a yeast infection.  As the wet prep showed, there are no concerns for ongoing BV or yeast.  This means that the Flagyl that you took for your BV did a good job of treating it, and there are no signs currently of a yeast infection.  As you are about to begin taking a medication for urinary tract infection, please keep an eye out for yeast infection symptoms including white curd-like discharge, itching or burning in the vaginal area, and vaginal discomfort.  If you begin having the symptoms, please take the Diflucan that was prescribed by your previous provider as instructed.    I have swabbed the lesion in your vaginal area today to ensure that it is not herpes simplex.  This test takes a couple of days to come back, so in the meantime, please avoid touching or picking at the site, ensure that you have good hand hygiene after using the restroom, and use only gentle topical products at the site to clean your vaginal area.    Please seek immediate medical attention with symptoms including severe swelling or pain at the site, abnormal vaginal bleeding, fever, chills, abdominal pain, nausea, vomiting, or general fatigue    Ilya Mancuso is a 23 year old, presenting for the following health issues:  STD and Recheck Medication (Pt reports that she is here for an STD check, pt reports that she noticed thick discharge, no smell, and also has a small bump towards left side of vagina.)        5/24/2024     8:23 AM   Additional Questions   Roomed by shailesh   Accompanied by alone         5/24/2024     8:23 AM   Patient Reported Additional Medications   Patient reports taking the following new medications none     History of Present Illness       Reason for visit:  Lab test    She eats 0-1 servings of fruits and vegetables daily.She consumes 1 sweetened  "beverage(s) daily.She exercises with enough effort to increase her heart rate 30 to 60 minutes per day.  She exercises with enough effort to increase her heart rate 4 days per week.   She is taking medications regularly.    Jamee is a 23-year-old female with a past medical history significant for chlamydia who presents today with concerns 1 lesion in her vaginal area.  Patient was seen in the clinic 2 days ago, and diagnosed with a urinary tract infection and BV.  She was treated with a course of Flagyl, and a prescription has been sent in to manage her urinary tract infection symptoms.  She was also sent with Diflucan prophylactically, but has not taken this.  Patient presents today, as about 1 day ago she noticed a painful lesion in the superior aspect of her left vulva.  She notes that it is a white pus filled lesion that is not draining, open, or crusted over.  She notes that it is quite tender to the touch.  She has never had a lesion like this before, and she has no known exposure to herpes simplex.  Outside of that, she continues to have a mild increased vaginal discharge and discomfort.  She notes ongoing discomfort with urination, but attest to the fact that she has not yet begun taking a medication to manage her UTI.  She is sexually active, but declines any possibility for pregnancy.  She declines any abdominal pain, nausea, vomiting, flank pain, blood in her urine, abnormal vaginal swelling, severe pelvic or vaginal pain, fever, chills, or bodyaches.      Review of Systems  Constitutional, HEENT, cardiovascular, pulmonary, gi and gu systems are negative, except as otherwise noted.      Objective    /72 (BP Location: Left arm, Patient Position: Sitting, Cuff Size: Adult Regular)   Pulse 116   Temp 98.6  F (37  C) (Oral)   Resp 16   Ht 1.651 m (5' 5\")   Wt 55.8 kg (123 lb 1.6 oz)   LMP 05/15/2024 (Exact Date)   SpO2 97%   Breastfeeding No   BMI 20.48 kg/m    Body mass index is 20.48 " kg/m .  Physical Exam   GENERAL: alert and no distress  RESP: lungs clear to auscultation - no rales, rhonchi or wheezes  CV: regular rate and rhythm, normal S1 S2, no S3 or S4, no murmur, click or rub, no peripheral edema  ABDOMEN: soft, nontender, no hepatosplenomegaly, no masses and bowel sounds normal   (female): normal female external genitalia, normal urethral meatus, normal vaginal mucosa.  Single 2 mm papular pustule at superior aspect of left vulva without drainage or significant erythema  MS: no gross musculoskeletal defects noted, no edema    Results for orders placed or performed in visit on 05/24/24 (from the past 24 hour(s))   Wet prep - Clinic Collect    Specimen: Vagina; Swab   Result Value Ref Range    Trichomonas Absent Absent    Yeast Absent Absent    Clue Cells Absent Absent    WBCs/high power field 3+ (A) None           Signed Electronically by: GALLO Guerrero CNP     Unknown

## 2024-05-24 NOTE — PATIENT INSTRUCTIONS
we will repeat your wet prep today to ensure that your ongoing vaginal symptoms are not related to a yeast infection.  As the wet prep showed, there are no concerns for ongoing BV or yeast.  This means that the Flagyl that you took for your BV did a good job of treating it, and there are no signs currently of a yeast infection.  As you are about to begin taking a medication for urinary tract infection, please keep an eye out for yeast infection symptoms including white curd-like discharge, itching or burning in the vaginal area, and vaginal discomfort.  If you begin having the symptoms, please take the Diflucan that was prescribed by your previous provider as instructed.    I have swabbed the lesion in your vaginal area today to ensure that it is not herpes simplex.  This test takes a couple of days to come back, so in the meantime, please avoid touching or picking at the site, ensure that you have good hand hygiene after using the restroom, and use only gentle topical products at the site to clean your vaginal area.    Please seek immediate medical attention with symptoms including severe swelling or pain at the site, abnormal vaginal bleeding, fever, chills, abdominal pain, nausea, vomiting, or general fatigue

## 2024-05-25 LAB
ALBUMIN SERPL BCG-MCNC: 4.5 G/DL (ref 3.5–5.2)
ALP SERPL-CCNC: 95 U/L (ref 40–150)
ALT SERPL W P-5'-P-CCNC: 10 U/L (ref 0–50)
ANION GAP SERPL CALCULATED.3IONS-SCNC: 12 MMOL/L (ref 7–15)
AST SERPL W P-5'-P-CCNC: 15 U/L (ref 0–45)
BILIRUB SERPL-MCNC: 0.3 MG/DL
BUN SERPL-MCNC: 12.1 MG/DL (ref 6–20)
CALCIUM SERPL-MCNC: 8.8 MG/DL (ref 8.6–10)
CHLORIDE SERPL-SCNC: 101 MMOL/L (ref 98–107)
CREAT SERPL-MCNC: 0.62 MG/DL (ref 0.51–0.95)
DEPRECATED HCO3 PLAS-SCNC: 24 MMOL/L (ref 22–29)
EGFRCR SERPLBLD CKD-EPI 2021: >90 ML/MIN/1.73M2
GLUCOSE SERPL-MCNC: 112 MG/DL (ref 70–99)
HSV1 DNA SPEC QL NAA+PROBE: DETECTED
HSV2 DNA SPEC QL NAA+PROBE: NOT DETECTED
POTASSIUM SERPL-SCNC: 3.9 MMOL/L (ref 3.4–5.3)
PROT SERPL-MCNC: 7.7 G/DL (ref 6.4–8.3)
SODIUM SERPL-SCNC: 137 MMOL/L (ref 135–145)

## 2024-05-27 ENCOUNTER — MYC MEDICAL ADVICE (OUTPATIENT)
Dept: FAMILY MEDICINE | Facility: CLINIC | Age: 24
End: 2024-05-27

## 2024-05-27 ENCOUNTER — OFFICE VISIT (OUTPATIENT)
Dept: URGENT CARE | Facility: URGENT CARE | Age: 24
End: 2024-05-27
Payer: COMMERCIAL

## 2024-05-27 VITALS
SYSTOLIC BLOOD PRESSURE: 117 MMHG | WEIGHT: 123 LBS | OXYGEN SATURATION: 99 % | HEIGHT: 65 IN | BODY MASS INDEX: 20.49 KG/M2 | TEMPERATURE: 97.5 F | HEART RATE: 69 BPM | DIASTOLIC BLOOD PRESSURE: 75 MMHG

## 2024-05-27 DIAGNOSIS — B00.9 HSV (HERPES SIMPLEX VIRUS) INFECTION: Primary | ICD-10-CM

## 2024-05-27 PROCEDURE — 99214 OFFICE O/P EST MOD 30 MIN: CPT | Performed by: PREVENTIVE MEDICINE

## 2024-05-27 RX ORDER — VALACYCLOVIR HYDROCHLORIDE 1 G/1
1000 TABLET, FILM COATED ORAL 2 TIMES DAILY
Qty: 20 TABLET | Refills: 0 | Status: SHIPPED | OUTPATIENT
Start: 2024-05-27 | End: 2024-06-06

## 2024-05-27 NOTE — PROGRESS NOTES
"Assessment & Plan     (B00.9) HSV (herpes simplex virus) infection  (primary encounter diagnosis)  Plan: valACYclovir (VALTREX) 1000 mg tablet    Valtrex for 10 days    Follow up in 10-14 days if not improving or sooner as needed           31 minutes spent by me on the date of the encounter doing chart review, history and exam, documentation and further activities per the note        No follow-ups on file.    Brian Anderson MD  [facility]    Subjective     Jamee Reddy is a 23 year old year old female who presents to clinic today for the following health issues:    Patient presents with:  Urgent Care: LOOKING FOR MEDICATION FOR HSV     This is a 22 yo female who presents with genital herpes infection for the past 3 days.  Had viral swab done in clinic then and it was positive for hsv1.  Never treated for herpes before.    Patient Active Problem List   Diagnosis    History of chlamydia       Current Outpatient Medications   Medication Sig Dispense Refill    sulfamethoxazole-trimethoprim (BACTRIM DS) 800-160 MG tablet Take 1 tablet by mouth 2 times daily for 3 days 6 tablet 0     No current facility-administered medications for this visit.       History reviewed. No pertinent past medical history.    Social History   reports that she has never smoked. She has never been exposed to tobacco smoke. She has never used smokeless tobacco. She reports that she does not drink alcohol and does not use drugs.    History reviewed. No pertinent family history.    Review of Systems  Constitutional, HEENT, cardiovascular, pulmonary, GI, , musculoskeletal, neuro, skin, endocrine and psych systems are negative, except as otherwise noted.      Objective    /75   Pulse 69   Temp 97.5  F (36.4  C) (Oral)   Ht 1.651 m (5' 5\")   Wt 55.8 kg (123 lb)   LMP 05/15/2024 (Exact Date)   SpO2 99%   BMI 20.47 kg/m    Physical Exam   GENERAL: alert and no distress  EYES: Eyes grossly normal to " inspection, PERRL and conjunctivae and sclerae normal  HENT: ear canals and TM's normal, nose and mouth without ulcers or lesions  NECK: no adenopathy, no asymmetry, masses, or scars  RESP: lungs clear to auscultation - no rales, rhonchi or wheezes  CV: regular rate and rhythm, normal S1 S2, no S3 or S4, no murmur, click or rub, no peripheral edema  ABDOMEN: soft, nontender, no hepatosplenomegaly, no masses and bowel sounds normal  MS: no gross musculoskeletal defects noted, no edema  SKIN: no suspicious lesions or rashes  NEURO: Normal strength and tone, mentation intact and speech normal  PSYCH: mentation appears normal, affect normal/bright

## 2024-06-05 ENCOUNTER — OFFICE VISIT (OUTPATIENT)
Dept: FAMILY MEDICINE | Facility: CLINIC | Age: 24
End: 2024-06-05
Payer: COMMERCIAL

## 2024-06-05 VITALS
SYSTOLIC BLOOD PRESSURE: 107 MMHG | HEART RATE: 77 BPM | TEMPERATURE: 97.7 F | RESPIRATION RATE: 12 BRPM | BODY MASS INDEX: 20.37 KG/M2 | OXYGEN SATURATION: 98 % | DIASTOLIC BLOOD PRESSURE: 72 MMHG | WEIGHT: 122.4 LBS

## 2024-06-05 DIAGNOSIS — B96.89 BV (BACTERIAL VAGINOSIS): Primary | ICD-10-CM

## 2024-06-05 DIAGNOSIS — N76.0 BV (BACTERIAL VAGINOSIS): Primary | ICD-10-CM

## 2024-06-05 PROCEDURE — 87210 SMEAR WET MOUNT SALINE/INK: CPT

## 2024-06-05 PROCEDURE — 99213 OFFICE O/P EST LOW 20 MIN: CPT

## 2024-06-05 RX ORDER — CLINDAMYCIN PHOSPHATE 20 MG/G
1 CREAM VAGINAL AT BEDTIME
Qty: 40 G | Refills: 0 | Status: CANCELLED | OUTPATIENT
Start: 2024-06-05 | End: 2024-06-12

## 2024-06-05 RX ORDER — METRONIDAZOLE 500 MG/1
500 TABLET ORAL 2 TIMES DAILY
Qty: 14 TABLET | Refills: 0 | Status: SHIPPED | OUTPATIENT
Start: 2024-06-05 | End: 2024-06-12

## 2024-06-05 NOTE — PROGRESS NOTES
Assessment & Plan     BV (bacterial vaginosis)  Positive for Clue cells. Has had frequent BV since October. Believes it is triggered after she has intercourse. Did not take full dose of metronidazole after most recent infection. Discussed with patient that is is indicated to use clindamycin gel since she has had three or more cases within the last 12 months and patient declined as she is afraid it may cause a yeast infection. Metronidazole tab ordered, repeated education and plan for patient to go to ob/gyn for further evaluation and treatment of frequent BV.   - Wet prep - Clinic Collect  - metroNIDAZOLE (FLAGYL) 500 MG tablet; Take 1 tablet (500 mg) by mouth 2 times daily for 7 days  - Ob/Gyn  Referral; Future      Subjective   Jamee is a 23 year old, presenting for the following health issues:  Vaginal Problem (Bv/ yeast infection follow up)      6/5/2024     7:27 AM   Additional Questions   Roomed by rosalia davis     Vaginal Problem     History of Present Illness       Reason for visit:  Bv/yeast infection    She eats 0-1 servings of fruits and vegetables daily.She consumes 0 sweetened beverage(s) daily.She exercises with enough effort to increase her heart rate 20 to 29 minutes per day.  She exercises with enough effort to increase her heart rate 3 or less days per week.   She is taking medications regularly.         Vaginal Symptoms  Onset/Duration: 3-4 days ago  Description:  Vaginal Discharge: white curd-like   Itching (Pruritis): YES  Burning sensation:  No  Odor: No  Accompanying Signs & Symptoms:  Urinary symptoms: No  Abdominal pain: No  Fever: No  History:   Sexually active: No  New Partner: No  Possibility of Pregnancy:  No  Recent antibiotic use: recently treated for BV and then also on valtrex  Previous vaginitis issues: YES  Precipitating or alleviating factors: None  Therapies tried and outcome: Flagyl (metronidazole)- thinks it helped  1-2 weeks ago    Started in October, has had 5 times  since then. Did not finish her last course of metronidazole. Has previously tolerated medication. Has always used metronidazole for treatment, first time she was diagnosed, she used metronidazole gel and developed yeast infection after.         Objective    /72 (BP Location: Left arm, Patient Position: Sitting, Cuff Size: Adult Regular)   Pulse 77   Temp 97.7  F (36.5  C) (Temporal)   Resp 12   Wt 55.5 kg (122 lb 6.4 oz)   LMP 05/15/2024 (Exact Date)   SpO2 98%   BMI 20.37 kg/m    Body mass index is 20.37 kg/m .  Physical Exam   GENERAL: alert and no distress  RESP: lungs clear to auscultation - no rales, rhonchi or wheezes  CV: regular rate and rhythm, normal S1 S2, no S3 or S4, no murmur, click or rub, no peripheral edema  ABDOMEN: soft, and nontender    Results for orders placed or performed in visit on 06/05/24 (from the past 24 hour(s))   Wet prep - Clinic Collect    Specimen: Vagina; Swab   Result Value Ref Range    Trichomonas Absent Absent    Yeast Absent Absent    Clue Cells Present (A) Absent    WBCs/high power field 1+ (A) None           Signed Electronically by: GALLO Greenwood CNP

## 2024-06-23 NOTE — PROGRESS NOTES
"Problem Visit    June 23, 2024    Subjective:    Jamee Reddy is a 23 year old female who presents for evaluation of recurrent bacterial vaginosis. Patient states she has been diagnosed with bacterial vaginosis three times this past year. Reports a recent diagnosis of HSV and was positive for Chlamydia this year. Denies any current changes in vaginal discharge, odor or vaginal itching. Patient reports that when diagnosed with bacterial vaginosis she has had no symptoms. States she uses condoms and has noticed that the bacterial vaginosis always occurs after intercourse.     LMP 6/14/24    ROS:   General: Denies fevers, chills, night sweats.  Skin: Denies new rashes or lesions.  HEENT: Denies headache, visual disturbance, throat swelling or difficulty swallowing.  CV: Denies chest pain, palpitations or shortness of breath.  GI: Denies N/V/D, blood in stool or constipation.  : Denies dysuria or polyuria.  Genital: Denies discharge.  MSK: Denies joint pain, muscles aches or difficulty ambulating.  Neurologic: Denies difficulty   Endocrine: Denies hot/cold intolerance, sweating, polyuria.  Psychiatric: Denies depression or anxiety.      Objective:    BP 92/66 (BP Location: Right arm, Patient Position: Sitting, Cuff Size: Adult Regular)   Pulse 72   Ht 1.651 m (5' 5\")   Wt 55.9 kg (123 lb 3.2 oz)   LMP 06/14/2024 (Exact Date)   BMI 20.50 kg/m      Physical Exam:  General Appearance: Alert, cooperative, no distress, appears stated age  Skin: Skin color, texture, turgor normal, no rashes or lesions.  Throat: Lips, mucosa, and tongue normal; teeth and gums normal  HEENT: grossly normal; otoscopic and opthalmic exam not performed.   Neck: Supple, symmetrical, trachea midline, no adenopathy  Respiratory: Normal respiratory effort  Cardiovascular: No peripheral edema.  Musculoskeletal: No digital cyanosis. Normal gait and station. Moves all limbs freely.  Neuro: Cranial nerves II-XII grossly intact.  Psych: Intact " judgment and insight. OX3 and conversational.      Assessment:  -Recurrent bacterial vaginosis, 3 episodes in last year  -History of HSV  -History of chlamydia     Plan:  -GC/CT and wet prep today  -Reviewed causes of bacterial vaginosis and how to prevent  -Reviewed treatment option if positive for bacterial vaginosis today: according to Up to Date treatment would include Clindamycin cream 2%, one applicator full at bedtime for 7 nights   -If positive for bacterial vaginosis today, will need to be notified of results and script for Clindamycin sent to pharmacy   -Discussed safe sex practices   -Return to Clinic as needed           Time spent:  Chart review/Pre-charting on 06/25/24: 6/25/24 10 minutes  Face-to-face visit: 20 minutes   Documentation:  10 minutes  Total time spent on day of service:  40-54 minutes      GALLO Engel, PASCALE, DINA  [facility]  Midwifery

## 2024-06-25 ENCOUNTER — OFFICE VISIT (OUTPATIENT)
Dept: MIDWIFE SERVICES | Facility: CLINIC | Age: 24
End: 2024-06-25
Payer: COMMERCIAL

## 2024-06-25 VITALS
BODY MASS INDEX: 20.53 KG/M2 | WEIGHT: 123.2 LBS | SYSTOLIC BLOOD PRESSURE: 92 MMHG | DIASTOLIC BLOOD PRESSURE: 66 MMHG | HEART RATE: 72 BPM | HEIGHT: 65 IN

## 2024-06-25 DIAGNOSIS — N76.0 BV (BACTERIAL VAGINOSIS): ICD-10-CM

## 2024-06-25 DIAGNOSIS — Z11.3 SCREEN FOR STD (SEXUALLY TRANSMITTED DISEASE): Primary | ICD-10-CM

## 2024-06-25 DIAGNOSIS — B96.89 BV (BACTERIAL VAGINOSIS): ICD-10-CM

## 2024-06-25 PROCEDURE — 99203 OFFICE O/P NEW LOW 30 MIN: CPT | Performed by: ADVANCED PRACTICE MIDWIFE

## 2024-06-25 PROCEDURE — 87210 SMEAR WET MOUNT SALINE/INK: CPT | Performed by: ADVANCED PRACTICE MIDWIFE

## 2024-06-25 PROCEDURE — 87491 CHLMYD TRACH DNA AMP PROBE: CPT | Performed by: ADVANCED PRACTICE MIDWIFE

## 2024-06-25 PROCEDURE — 87591 N.GONORRHOEAE DNA AMP PROB: CPT | Performed by: ADVANCED PRACTICE MIDWIFE

## 2024-06-26 LAB
C TRACH DNA SPEC QL PROBE+SIG AMP: NEGATIVE
N GONORRHOEA DNA SPEC QL NAA+PROBE: NEGATIVE

## 2024-08-26 ENCOUNTER — OFFICE VISIT (OUTPATIENT)
Dept: INTERNAL MEDICINE | Facility: CLINIC | Age: 24
End: 2024-08-26
Payer: COMMERCIAL

## 2024-08-26 VITALS
OXYGEN SATURATION: 99 % | WEIGHT: 127 LBS | BODY MASS INDEX: 21.16 KG/M2 | DIASTOLIC BLOOD PRESSURE: 64 MMHG | TEMPERATURE: 98.1 F | HEART RATE: 87 BPM | SYSTOLIC BLOOD PRESSURE: 100 MMHG | HEIGHT: 65 IN

## 2024-08-26 DIAGNOSIS — N89.8 VAGINAL DISCHARGE: Primary | ICD-10-CM

## 2024-08-26 PROCEDURE — 87591 N.GONORRHOEAE DNA AMP PROB: CPT | Performed by: PHYSICIAN ASSISTANT

## 2024-08-26 PROCEDURE — 87491 CHLMYD TRACH DNA AMP PROBE: CPT | Performed by: PHYSICIAN ASSISTANT

## 2024-08-26 PROCEDURE — 99213 OFFICE O/P EST LOW 20 MIN: CPT | Performed by: PHYSICIAN ASSISTANT

## 2024-08-26 PROCEDURE — 87210 SMEAR WET MOUNT SALINE/INK: CPT | Performed by: PHYSICIAN ASSISTANT

## 2024-08-26 NOTE — PROGRESS NOTES
"  Assessment & Plan     Vaginal discharge    - Chlamydia trachomatis/Neisseria gonorrhoeae by PCR - Clinic Collect  - Wet prep - Clinic Collect            Notified by my chart on results     Ilya Mancuso is a 23 year old, presenting for the following health issues:  Vaginal Problem    History of Present Illness       Reason for visit:  Bv    She eats 2-3 servings of fruits and vegetables daily.She consumes 0 sweetened beverage(s) daily.She exercises with enough effort to increase her heart rate 30 to 60 minutes per day.  She exercises with enough effort to increase her heart rate 3 or less days per week.   She is taking medications regularly.         Vaginal Symptoms  Onset/Duration: 3-4 days ago   Description:  Vaginal Discharge: clear   Itching (Pruritis): No  Burning sensation:  No  Odor: No  Accompanying Signs & Symptoms:  Urinary symptoms: No  Abdominal pain: YES- some cramping   Fever: No  History:   Sexually active: YES  New Partner: No  Possibility of Pregnancy:  No  Recent antibiotic use: No  Previous vaginitis issues: frequent BV   Precipitating or alleviating factors: None  Therapies tried and outcome: none and increasing fluids   Last week with menses   Normal menses             Objective    /64   Pulse 87   Temp 98.1  F (36.7  C) (Temporal)   Ht 1.651 m (5' 5\")   Wt 57.6 kg (127 lb)   LMP 08/19/2024 (Exact Date)   SpO2 99%   BMI 21.13 kg/m    Body mass index is 21.13 kg/m .  Physical Exam   GENERAL: alert and no distress  RESP: lungs clear to auscultation - no rales, rhonchi or wheezes  CV: regular rates and rhythm   (female): deferred    Results for orders placed or performed in visit on 08/26/24 (from the past 24 hour(s))   Wet prep - Clinic Collect    Specimen: Vagina; Swab   Result Value Ref Range    Trichomonas Absent Absent    Yeast Absent Absent    Clue Cells Absent Absent    WBCs/high power field 2+ (A) None           Signed Electronically by: Suzanne Bird PA-C    "

## 2024-08-27 LAB
C TRACH DNA SPEC QL PROBE+SIG AMP: NEGATIVE
N GONORRHOEA DNA SPEC QL NAA+PROBE: NEGATIVE

## 2024-09-23 ENCOUNTER — OFFICE VISIT (OUTPATIENT)
Dept: PEDIATRICS | Facility: CLINIC | Age: 24
End: 2024-09-23
Payer: COMMERCIAL

## 2024-09-23 VITALS
DIASTOLIC BLOOD PRESSURE: 62 MMHG | SYSTOLIC BLOOD PRESSURE: 112 MMHG | OXYGEN SATURATION: 100 % | HEIGHT: 65 IN | TEMPERATURE: 97.8 F | WEIGHT: 123 LBS | HEART RATE: 79 BPM | RESPIRATION RATE: 12 BRPM | BODY MASS INDEX: 20.49 KG/M2

## 2024-09-23 DIAGNOSIS — Z11.3 ROUTINE SCREENING FOR STI (SEXUALLY TRANSMITTED INFECTION): ICD-10-CM

## 2024-09-23 DIAGNOSIS — R30.0 DYSURIA: ICD-10-CM

## 2024-09-23 DIAGNOSIS — N89.8 VAGINAL DISCHARGE: Primary | ICD-10-CM

## 2024-09-23 LAB
ALBUMIN UR-MCNC: NEGATIVE MG/DL
APPEARANCE UR: CLEAR
BACTERIA #/AREA URNS HPF: ABNORMAL /HPF
BILIRUB UR QL STRIP: NEGATIVE
CLUE CELLS: ABNORMAL
COLOR UR AUTO: YELLOW
GLUCOSE UR STRIP-MCNC: NEGATIVE MG/DL
HCV AB SERPL QL IA: NONREACTIVE
HGB UR QL STRIP: NEGATIVE
HIV 1+2 AB+HIV1 P24 AG SERPL QL IA: NONREACTIVE
KETONES UR STRIP-MCNC: 40 MG/DL
LEUKOCYTE ESTERASE UR QL STRIP: NEGATIVE
NITRATE UR QL: POSITIVE
PH UR STRIP: 5.5 [PH] (ref 5–7)
RBC #/AREA URNS AUTO: ABNORMAL /HPF
SP GR UR STRIP: 1.01 (ref 1–1.03)
SQUAMOUS #/AREA URNS AUTO: ABNORMAL /LPF
T PALLIDUM AB SER QL: NONREACTIVE
TRICHOMONAS, WET PREP: ABNORMAL
UROBILINOGEN UR STRIP-ACNC: 0.2 E.U./DL
WBC #/AREA URNS AUTO: ABNORMAL /HPF
WBC'S/HIGH POWER FIELD, WET PREP: ABNORMAL
YEAST, WET PREP: ABNORMAL

## 2024-09-23 PROCEDURE — 87591 N.GONORRHOEAE DNA AMP PROB: CPT | Performed by: NURSE PRACTITIONER

## 2024-09-23 PROCEDURE — 99213 OFFICE O/P EST LOW 20 MIN: CPT | Performed by: NURSE PRACTITIONER

## 2024-09-23 PROCEDURE — 87210 SMEAR WET MOUNT SALINE/INK: CPT | Performed by: NURSE PRACTITIONER

## 2024-09-23 PROCEDURE — 87389 HIV-1 AG W/HIV-1&-2 AB AG IA: CPT | Performed by: NURSE PRACTITIONER

## 2024-09-23 PROCEDURE — 86803 HEPATITIS C AB TEST: CPT | Performed by: NURSE PRACTITIONER

## 2024-09-23 PROCEDURE — 81001 URINALYSIS AUTO W/SCOPE: CPT | Performed by: NURSE PRACTITIONER

## 2024-09-23 PROCEDURE — 87491 CHLMYD TRACH DNA AMP PROBE: CPT | Performed by: NURSE PRACTITIONER

## 2024-09-23 PROCEDURE — 36415 COLL VENOUS BLD VENIPUNCTURE: CPT | Performed by: NURSE PRACTITIONER

## 2024-09-23 PROCEDURE — 87086 URINE CULTURE/COLONY COUNT: CPT | Performed by: NURSE PRACTITIONER

## 2024-09-23 PROCEDURE — 86780 TREPONEMA PALLIDUM: CPT | Performed by: NURSE PRACTITIONER

## 2024-09-23 ASSESSMENT — PAIN SCALES - GENERAL: PAINLEVEL: NO PAIN (0)

## 2024-09-23 NOTE — PROGRESS NOTES
"  Assessment & Plan     Vaginal discharge  Exam is unremarkable. Wet prep negative for yeast, BV, trich. Will await STI screening and UA results.   - Chlamydia trachomatis/Neisseria gonorrhoeae by PCR - Clinic Collect  - Wet prep - Clinic Collect    Routine screening for STI (sexually transmitted infection)  Denies any known exposures. Results pending.   - Chlamydia trachomatis/Neisseria gonorrhoeae by PCR - Clinic Collect  - Wet prep - Clinic Collect  - HIV Antigen Antibody Combo  - Hepatitis C Screen Reflex to HCV RNA Quant and Genotype  - Treponema Abs w Reflex to RPR and Titer    Dysuria  - UA Macroscopic with reflex to Microscopic and Culture - Clinic Collect          Ilya Mancuso is a 24 year old, presenting for the following health issues:  Vaginal Problem (Yeast/BV symptoms )        9/23/2024     2:44 PM   Additional Questions   Roomed by Alison RIOJAS       History of Present Illness       Reason for visit:  Bv or yeast  Symptom onset:  3-7 days ago  Symptoms include:  Dry, painful, redness, watery discharge, mild itching  Symptom intensity:  Mild  Symptom progression:  Staying the same  Had these symptoms before:  Yes  Has tried/received treatment for these symptoms:  Yes  Previous treatment was successful:  Yes  Prior treatment description:  Medication    She eats 2-3 servings of fruits and vegetables daily.She consumes 0 sweetened beverage(s) daily.She exercises with enough effort to increase her heart rate 20 to 29 minutes per day.  She exercises with enough effort to increase her heart rate 3 or less days per week.   She is taking medications regularly.     Sexually active.   Not in a monogamous relationship.     Not on prescription contraception, does use comdoms.   Male partners  Regular periods.         Objective    /62 (BP Location: Right arm, Patient Position: Sitting, Cuff Size: Adult Regular)   Pulse 79   Temp 97.8  F (36.6  C) (Oral)   Resp 12   Ht 1.651 m (5' 5\")   Wt 55.8 " kg (123 lb)   LMP 09/08/2024 (Approximate)   SpO2 100%   BMI 20.47 kg/m    Body mass index is 20.47 kg/m .  Physical Exam   Constitutional: appears to be in no acute distress, comfortable, pleasant.   Cardiovascular: regular rate   Respiratory: Breathing pattern unlabored without the use of accessory muscles.    Genitourinary: external genitalia is without lesions. Introitus is normal, vaginal walls pink and moist without lesions or evidence of trauma. Large amount of watery white discharge. Cervix is pink without polyps or lesions.  Neurological: normal gait, no tremor.   Psychological: appropriate mood and affect.               Signed Electronically by: GALLO Rod CNP

## 2024-09-24 LAB
BACTERIA UR CULT: NO GROWTH
C TRACH DNA SPEC QL PROBE+SIG AMP: NEGATIVE
N GONORRHOEA DNA SPEC QL NAA+PROBE: NEGATIVE

## 2024-10-09 ENCOUNTER — OFFICE VISIT (OUTPATIENT)
Dept: FAMILY MEDICINE | Facility: CLINIC | Age: 24
End: 2024-10-09
Payer: COMMERCIAL

## 2024-10-09 VITALS
HEIGHT: 65 IN | OXYGEN SATURATION: 99 % | DIASTOLIC BLOOD PRESSURE: 60 MMHG | TEMPERATURE: 97.7 F | HEART RATE: 82 BPM | BODY MASS INDEX: 20.83 KG/M2 | SYSTOLIC BLOOD PRESSURE: 84 MMHG | WEIGHT: 125 LBS

## 2024-10-09 DIAGNOSIS — Z11.3 SCREEN FOR STD (SEXUALLY TRANSMITTED DISEASE): ICD-10-CM

## 2024-10-09 DIAGNOSIS — J02.9 ACUTE PHARYNGITIS, UNSPECIFIED ETIOLOGY: Primary | ICD-10-CM

## 2024-10-09 PROBLEM — Z86.018 HX OF DYSPLASTIC NEVUS: Status: RESOLVED | Noted: 2023-07-05 | Resolved: 2024-10-09

## 2024-10-09 LAB
DEPRECATED S PYO AG THROAT QL EIA: NEGATIVE
GROUP A STREP BY PCR: NOT DETECTED

## 2024-10-09 PROCEDURE — 99213 OFFICE O/P EST LOW 20 MIN: CPT | Performed by: FAMILY MEDICINE

## 2024-10-09 PROCEDURE — 86803 HEPATITIS C AB TEST: CPT | Performed by: FAMILY MEDICINE

## 2024-10-09 PROCEDURE — 87491 CHLMYD TRACH DNA AMP PROBE: CPT | Performed by: FAMILY MEDICINE

## 2024-10-09 PROCEDURE — 86780 TREPONEMA PALLIDUM: CPT | Performed by: FAMILY MEDICINE

## 2024-10-09 PROCEDURE — 36415 COLL VENOUS BLD VENIPUNCTURE: CPT | Performed by: FAMILY MEDICINE

## 2024-10-09 PROCEDURE — 87651 STREP A DNA AMP PROBE: CPT | Performed by: FAMILY MEDICINE

## 2024-10-09 PROCEDURE — 87389 HIV-1 AG W/HIV-1&-2 AB AG IA: CPT | Performed by: FAMILY MEDICINE

## 2024-10-09 PROCEDURE — 87591 N.GONORRHOEAE DNA AMP PROB: CPT | Performed by: FAMILY MEDICINE

## 2024-10-09 PROCEDURE — 87340 HEPATITIS B SURFACE AG IA: CPT | Performed by: FAMILY MEDICINE

## 2024-10-09 ASSESSMENT — ENCOUNTER SYMPTOMS
SORE THROAT: 1
HEADACHES: 1

## 2024-10-09 NOTE — PROGRESS NOTES
"  Assessment & Plan     (J02.9) Acute pharyngitis, unspecified etiology  (primary encounter diagnosis)  Plan: Streptococcus A Rapid Screen w/Reflex to PCR -         Clinic Collect, Chlamydia & Gonorrhea by PCR,         [facility] - Clinic Collect, Group A         Streptococcus PCR Throat Swab  (Z11.3) Screen for STD (sexually transmitted disease)  Plan: HIV Antigen Antibody Combo, Treponema Abs w         Reflex to RPR and Titer, Hepatitis C Screen         Reflex to HCV RNA Quant and Genotype, Hepatitis        B surface antigen  EHR reviewed.   Past medical history, problem list, past surgical history, family history, social history, medications reviewed, updated, reconciled.   Strep testing negative. Will follow culture. Reviewed supportive care. If negative culture she is ok monitoring and continuing home care measures. If any changes or worsening will follow up.   Reviewed options of STD screening. Testing collected as she requested. Counseled on safe sexual practices.   Deferred immunizations.         Ilya Mancuso is a 24 year old, presenting for the following health issues:  Pharyngitis and Headache      10/9/2024     2:47 PM   Additional Questions   Roomed by M   Accompanied by self     Pharyngitis   Associated symptoms include headaches.   Headache     History of Present Illness       Headaches:   Since the patient's last clinic visit, headaches are: no change  The patient is getting headaches:  Daily  She is not able to do normal daily activities when she has a migraine.  The patient is taking the following rescue/relief medications:  No rescue/relief medications   Patient states \"I get no relief\" from the rescue/relief medications.   The patient is taking the following medications to prevent migraines:  No medications to prevent migraines  In the past 4 weeks, the patient has gone to an Urgent Care or Emergency Room 0 times times due to headaches.   She is taking medications regularly.     [age] year old female here with concerns of sore throat. This started about a week ago. Associated with a headache. Has not taken any medication. No fever. No cough. Is wondering if she should check for strep throat. No known exposures at this time.   Would also like to check for STD. No known exposure. No abnormal vaginal discharge or bleeding. No dysuria.     Past Medical History:   Diagnosis Date    Chlamydia     Herpes simplex virus (HSV) infection     Hx of dysplastic nevus 07/05/2023    Formatting of this note might be different from the original.   DN, severe atypia, left posterior shoulder, s/p shave 12/21/22   DN, severe atypia, left chest, s/p shave 12/21/22      Urinary tract infection     Vaginal infection      No past surgical history on file.          Objective    BP (!) 84/60 (BP Location: Left arm, Patient Position: Sitting, Cuff Size: Adult Regular)   Pulse 82   Temp 97.7  F (36.5  C) (Temporal)   Ht 1.66 m (5' 5.35\")   Wt 56.7 kg (125 lb)   LMP 10/05/2024 (Approximate)   SpO2 99%   BMI 20.58 kg/m    Body mass index is 20.58 kg/m .  Physical Exam   GENERAL: alert and no distress  EYES: Eyes grossly normal to inspection, PERRL and conjunctivae and sclerae normal  HENT: ear canals and TM's normal, nose and mouth without ulcers or lesions  NECK: no adenopathy, no asymmetry, masses, or scars  RESP: lungs clear to auscultation - no rales, rhonchi or wheezes  CV: regular rate and rhythm, normal S1 S2, no S3 or S4, no murmur, click or rub, no peripheral edema  MS: no gross musculoskeletal defects noted, no edema  SKIN: no suspicious lesions or rashes  NEURO: Normal strength and tone, mentation intact and speech normal    Results for orders placed or performed in visit on 10/09/24   HIV Antigen Antibody Combo     Status: Normal   Result Value Ref Range    HIV Antigen Antibody Combo Nonreactive Nonreactive   Treponema Abs w Reflex to RPR and Titer     Status: Normal   Result Value Ref Range    " Treponema Antibody Total Nonreactive Nonreactive   Hepatitis C Screen Reflex to HCV RNA Quant and Genotype     Status: Normal   Result Value Ref Range    Hepatitis C Antibody Nonreactive Nonreactive   Hepatitis B surface antigen     Status: Normal   Result Value Ref Range    Hepatitis B Surface Antigen Nonreactive Nonreactive   Streptococcus A Rapid Screen w/Reflex to PCR - Clinic Collect     Status: Normal    Specimen: Throat; Swab   Result Value Ref Range    Group A Strep antigen Negative Negative   Chlamydia & Gonorrhea by PCR, [facility] - Clinic Collect     Status: Normal    Specimen: Urine, Voided   Result Value Ref Range    Chlamydia Trachomatis Negative Negative    Neisseria gonorrhoeae Negative Negative   Group A Streptococcus PCR Throat Swab     Status: Normal    Specimen: Throat; Swab   Result Value Ref Range    Group A strep by PCR Not Detected Not Detected    Narrative    The Xpert Xpress Strep A test, performed on the [employer], is a rapid, qualitative in vitro diagnostic test for the detection of Streptococcus pyogenes (Group A ß-hemolytic Streptococcus, Strep A) in throat swab specimens from patients with signs and symptoms of pharyngitis. The Xpert Xpress Strep A test can be used as an aid in the diagnosis of Group A Streptococcal pharyngitis. The assay is not intended to monitor treatment for Group A Streptococcus infections. The Xpert Xpress Strep A test utilizes an automated real-time polymerase chain reaction (PCR) to detect Streptococcus pyogenes DNA.           Signed Electronically by: Javed Watters MD      Prior to immunization administration, verified patients identity using patient s name and date of birth. Please see Immunization Activity for additional information.     Screening Questionnaire for Adult Immunization      Are you sick today?   Yes   Do you have allergies to medications, food, a vaccine component or latex?   No   Have you ever had a serious reaction after  receiving a vaccination?   No   Do you have a long-term health problem with heart, lung, kidney, or metabolic disease (e.g., diabetes), asthma, a blood disorder, no spleen, complement component deficiency, a cochlear implant, or a spinal fluid leak?  Are you on long-term aspirin therapy?   No   Do you have cancer, leukemia, HIV/AIDS, or any other immune system problem?   No   Do you have a parent, brother, or sister with an immune system problem?   No   In the past 3 months, have you taken medications that affect  your immune system, such as prednisone, other steroids, or anticancer drugs; drugs for the treatment of rheumatoid arthritis, Crohn s disease, or psoriasis; or have you had radiation treatments?   No   Have you had a seizure, or a brain or other nervous system problem?   No   During the past year, have you received a transfusion of blood or blood    products, or been given immune (gamma) globulin or antiviral drug?   No   For women: Are you pregnant or is there a chance you could become       pregnant during the next month?   No   Have you received any vaccinations in the past 4 weeks?   No     Immunization questionnaire was positive for at least one answer.  Notified .      Patient instructed to remain in clinic for 15 minutes afterwards, and to report any adverse reactions.     Screening performed by CLAUDIA Varela MA on 10/9/2024 at 2:52 PM.

## 2024-10-10 LAB
C TRACH DNA SPEC QL PROBE+SIG AMP: NEGATIVE
HBV SURFACE AG SERPL QL IA: NONREACTIVE
HCV AB SERPL QL IA: NONREACTIVE
HIV 1+2 AB+HIV1 P24 AG SERPL QL IA: NONREACTIVE
N GONORRHOEA DNA SPEC QL NAA+PROBE: NEGATIVE
T PALLIDUM AB SER QL: NONREACTIVE

## 2024-10-24 ENCOUNTER — OFFICE VISIT (OUTPATIENT)
Dept: FAMILY MEDICINE | Facility: CLINIC | Age: 24
End: 2024-10-24
Payer: COMMERCIAL

## 2024-10-24 VITALS
HEART RATE: 88 BPM | BODY MASS INDEX: 20.83 KG/M2 | TEMPERATURE: 98.1 F | HEIGHT: 65 IN | OXYGEN SATURATION: 100 % | RESPIRATION RATE: 16 BRPM | SYSTOLIC BLOOD PRESSURE: 104 MMHG | DIASTOLIC BLOOD PRESSURE: 60 MMHG | WEIGHT: 125 LBS

## 2024-10-24 DIAGNOSIS — N76.0 VAGINITIS AND VULVOVAGINITIS: Primary | ICD-10-CM

## 2024-10-24 PROBLEM — A60.04 HERPES SIMPLEX VIRUS (HSV) INFECTION OF VAGINA: Status: ACTIVE | Noted: 2024-10-24

## 2024-10-24 PROCEDURE — 87210 SMEAR WET MOUNT SALINE/INK: CPT | Performed by: NURSE PRACTITIONER

## 2024-10-24 PROCEDURE — 87491 CHLMYD TRACH DNA AMP PROBE: CPT | Performed by: NURSE PRACTITIONER

## 2024-10-24 PROCEDURE — 99213 OFFICE O/P EST LOW 20 MIN: CPT | Performed by: NURSE PRACTITIONER

## 2024-10-24 PROCEDURE — 87591 N.GONORRHOEAE DNA AMP PROB: CPT | Performed by: NURSE PRACTITIONER

## 2024-10-24 NOTE — PROGRESS NOTES
"  Assessment & Plan     Vaginitis and vulvovaginitis  - Chlamydia trachomatis/Neisseria gonorrhoeae by PCR  - Wet prep - Clinic Collect  Await for results and treat per findings.                 Ilya Mancuso is a 24 year old, presenting for the following health issues:  Vaginal Problem (Vaginal discharge and odor x 2 days )        10/24/2024     3:23 PM   Additional Questions   Roomed by JESSICA Caba   Accompanied by alone     Vaginal discharge for 2 days- white, a little clumpy and odor, a little itchy.  No fever, chills, vaginal lesions, pelvic pain, or UTI symptoms.   Sexually active with 1 male partner.       History of Present Illness       Reason for visit:  Bv    She eats 2-3 servings of fruits and vegetables daily.She consumes 0 sweetened beverage(s) daily.She exercises with enough effort to increase her heart rate 20 to 29 minutes per day.  She exercises with enough effort to increase her heart rate 3 or less days per week.   She is taking medications regularly.                     Objective    /60 (BP Location: Left arm, Patient Position: Sitting, Cuff Size: Adult Small)   Pulse 88   Temp 98.1  F (36.7  C) (Tympanic)   Resp 16   Ht 1.651 m (5' 5\")   Wt 56.7 kg (125 lb)   LMP 10/05/2024 (Approximate)   SpO2 100%   BMI 20.80 kg/m    Body mass index is 20.8 kg/m .  Physical Exam   GENERAL: alert and no distress  NECK: no adenopathy, no asymmetry, masses, or scars  RESP: lungs clear to auscultation - no rales, rhonchi or wheezes  CV: regular rate and rhythm, normal S1 S2, no S3 or S4, no murmur, click or rub, no peripheral edema  ABDOMEN: soft, nontender, no hepatosplenomegaly, no masses and bowel sounds normal   (female): normal female external genitalia, normal urethral meatus, normal vaginal mucosa, mild white vaginal discharge.  MS: no gross musculoskeletal defects noted, no edema            Signed Electronically by: GALLO Luu CNP    "

## 2024-10-25 ENCOUNTER — OFFICE VISIT (OUTPATIENT)
Dept: FAMILY MEDICINE | Facility: CLINIC | Age: 24
End: 2024-10-25
Payer: COMMERCIAL

## 2024-10-25 VITALS
RESPIRATION RATE: 15 BRPM | TEMPERATURE: 98 F | OXYGEN SATURATION: 97 % | HEART RATE: 100 BPM | WEIGHT: 125 LBS | HEIGHT: 65 IN | DIASTOLIC BLOOD PRESSURE: 62 MMHG | SYSTOLIC BLOOD PRESSURE: 110 MMHG | BODY MASS INDEX: 20.83 KG/M2

## 2024-10-25 DIAGNOSIS — Z11.3 SCREENING EXAMINATION FOR STI: ICD-10-CM

## 2024-10-25 DIAGNOSIS — B00.9 HSV (HERPES SIMPLEX VIRUS) INFECTION: Primary | ICD-10-CM

## 2024-10-25 LAB
C TRACH DNA SPEC QL PROBE+SIG AMP: NEGATIVE
N GONORRHOEA DNA SPEC QL NAA+PROBE: NEGATIVE

## 2024-10-25 PROCEDURE — 99213 OFFICE O/P EST LOW 20 MIN: CPT | Performed by: FAMILY MEDICINE

## 2024-10-25 PROCEDURE — G2211 COMPLEX E/M VISIT ADD ON: HCPCS | Performed by: FAMILY MEDICINE

## 2024-10-25 RX ORDER — VALACYCLOVIR HYDROCHLORIDE 1 G/1
1000 TABLET, FILM COATED ORAL 2 TIMES DAILY
Qty: 20 TABLET | Refills: 11 | Status: SHIPPED | OUTPATIENT
Start: 2024-10-25

## 2024-10-25 NOTE — PROGRESS NOTES
"Assessment & Plan   The longitudinal plan of care for the diagnosis(es)/condition(s) as documented were addressed during this visit. Due to the added complexity in care, I will continue to support Jamee in the subsequent management and with ongoing continuity of care.  HSV (herpes simplex virus) infection  Discussed the indications for taking daily preventatives.  - valACYclovir (VALTREX) 1000 mg tablet  Dispense: 20 tablet; Refill: 11    Screening examination for STI  Made the tests standing.  Discussed PrEP and doxyPEP. These are not indicated at this time.  She has a history of chlamydia.  - Multiplex Vaginal Panel by PCR  - NEISSERIA GONORRHOEA PCR  - CHLAMYDIA TRACHOMATIS PCR  - NEISSERIA GONORRHOEA PCR  - CHLAMYDIA TRACHOMATIS PCR  - Treponema Abs w Reflex to RPR and Titer  - HIV Antigen Antibody Combo  Subjective   Jamee is a 24 year old, presenting for the following health issues:  Refill Request (Would like Rx renewed on Valtrex )    Feeling a tingle where she had HSV in May.  Two partners in the last year.    History of Present Illness       Reason for visit:  Bv    She eats 2-3 servings of fruits and vegetables daily.She consumes 0 sweetened beverage(s) daily.She exercises with enough effort to increase her heart rate 20 to 29 minutes per day.  She exercises with enough effort to increase her heart rate 3 or less days per week.   She is taking medications regularly.       Objective    /62 (BP Location: Left arm, Patient Position: Sitting, Cuff Size: Adult Regular)   Pulse 100   Temp 98  F (36.7  C) (Tympanic)   Resp 15   Ht 1.651 m (5' 5\")   Wt 56.7 kg (125 lb)   LMP 10/05/2024 (Approximate)   SpO2 97%   BMI 20.80 kg/m    Body mass index is 20.8 kg/m .  Physical Exam   GENERAL: healthy, alert and no distress  EYES: grossly normal to inspection, and conjunctivae and sclerae normal  RESP: breathing unlabored, no cough or throat clearing.  MS: no gross musculoskeletal defects " noted.  SKIN: no suspicious lesions or rashes visible  NEURO: Normal strength and tone, mentation intact and speech normal  PSYCH: mentation appears normal, affect normal/bright   Office Visit on 10/24/2024   Component Date Value Ref Range Status    Chlamydia Trachomatis 10/24/2024 Negative  Negative Final    Negative for C. trachomatis rRNA by transcription mediated amplification.   A negative result by transcription mediated amplification does not preclude the presence of infection because results are dependent on proper and adequate collection, absence of inhibitors and sufficient rRNA to be detected.    Neisseria gonorrhoeae 10/24/2024 Negative  Negative Final    Negative for N. gonorrhoeae rRNA by transcription mediated amplification. A negative result by transcription mediated amplification does not preclude the presence of C. trachomatis infection because results are dependent on proper and adequate collection, absence of inhibitors and sufficient rRNA to be detected.    Trichomonas 10/24/2024 Absent  Absent Final    Yeast 10/24/2024 Absent  Absent Final    Clue Cells 10/24/2024 Absent  Absent Final    WBCs/high power field 10/24/2024 1+ (A)  None Final     No results found for any visits on 10/25/24.      Signed Electronically by: PHOENIX MIRANDA MD

## 2024-12-31 ENCOUNTER — OFFICE VISIT (OUTPATIENT)
Dept: FAMILY MEDICINE | Facility: CLINIC | Age: 24
End: 2024-12-31
Payer: COMMERCIAL

## 2024-12-31 VITALS
HEART RATE: 100 BPM | TEMPERATURE: 98.1 F | SYSTOLIC BLOOD PRESSURE: 112 MMHG | WEIGHT: 122.9 LBS | RESPIRATION RATE: 16 BRPM | DIASTOLIC BLOOD PRESSURE: 72 MMHG | HEIGHT: 64 IN | OXYGEN SATURATION: 98 % | BODY MASS INDEX: 20.98 KG/M2

## 2024-12-31 DIAGNOSIS — Z11.3 SCREEN FOR STD (SEXUALLY TRANSMITTED DISEASE): ICD-10-CM

## 2024-12-31 DIAGNOSIS — D22.9 CHANGE IN MOLE: Primary | ICD-10-CM

## 2024-12-31 PROCEDURE — 87210 SMEAR WET MOUNT SALINE/INK: CPT

## 2024-12-31 PROCEDURE — 87591 N.GONORRHOEAE DNA AMP PROB: CPT

## 2024-12-31 PROCEDURE — 87491 CHLMYD TRACH DNA AMP PROBE: CPT

## 2024-12-31 PROCEDURE — 99213 OFFICE O/P EST LOW 20 MIN: CPT

## 2024-12-31 PROCEDURE — G2211 COMPLEX E/M VISIT ADD ON: HCPCS

## 2024-12-31 ASSESSMENT — PAIN SCALES - GENERAL: PAINLEVEL_OUTOF10: NO PAIN (0)

## 2024-12-31 NOTE — PROGRESS NOTES
Assessment & Plan     Change in mole  Approximately 0.1 cm circular brown mole to left chest above left breast.  With history of precancerous moles, placed referral to dermatology.  - Adult Dermatology  Referral; Future    Screen for STD (sexually transmitted disease)  Would like routine STD testing, asymptomatic.  - Chlamydia trachomatis/Neisseria gonorrhoeae by PCR  - Wet preparation    The longitudinal plan of care for the diagnosis(es)/condition(s) as documented were addressed during this visit. Due to the added complexity in care, I will continue to support Jamee in the subsequent management and with ongoing continuity of care.    Subjective   Jamee is a 24 year old, presenting for the following health issues:  Referral (dermatology)      12/31/2024     3:16 PM   Additional Questions   Roomed by Agnieszka MEDRANO CMA     HPI   Patient is a 24-year old female presenting for concerns of changing skin lesion.  This summer noted mole to left chest above left breast.  Skin lesion has changed in color, gotten darker and has increased in size.  No bleeding.  Previously had moles removed from mid left chest and left posterior upper arm.  Pathology showed precancerous mole.  No skin cancer in family.  Would like routine STD testing and wet prep.  No new sexual partners since last testing.  No symtpoms.      Review of Systems  Review of systems negative otherwise known HPI.      Objective    LMP 12/05/2024 (Exact Date)   There is no height or weight on file to calculate BMI.  Physical Exam   General: Alert, oriented, no acute distress.    Respiratory: Easy work of breathing.   Cardiovascular: Appears warm and well-perfused.    Skin: Approximately 0.1 cm circular brown mole to left chest above left breast.    Neurologic: Mentation intact and speech normal.     Psychiatric: Appropriate affect.     Signed Electronically by: GALLO Palacios CNP

## 2025-01-01 LAB
C TRACH DNA SPEC QL PROBE+SIG AMP: NEGATIVE
N GONORRHOEA DNA SPEC QL NAA+PROBE: NEGATIVE

## 2025-01-20 ENCOUNTER — OFFICE VISIT (OUTPATIENT)
Dept: FAMILY MEDICINE | Facility: CLINIC | Age: 25
End: 2025-01-20
Payer: COMMERCIAL

## 2025-01-20 VITALS
WEIGHT: 125.2 LBS | HEART RATE: 85 BPM | OXYGEN SATURATION: 100 % | DIASTOLIC BLOOD PRESSURE: 75 MMHG | SYSTOLIC BLOOD PRESSURE: 113 MMHG | TEMPERATURE: 97.5 F | BODY MASS INDEX: 20.86 KG/M2 | RESPIRATION RATE: 16 BRPM | HEIGHT: 65 IN

## 2025-01-20 DIAGNOSIS — R30.0 DYSURIA: ICD-10-CM

## 2025-01-20 DIAGNOSIS — Z11.3 SCREEN FOR STD (SEXUALLY TRANSMITTED DISEASE): ICD-10-CM

## 2025-01-20 DIAGNOSIS — N89.8 VAGINAL DISCHARGE: ICD-10-CM

## 2025-01-20 DIAGNOSIS — N76.0 BACTERIAL VAGINOSIS: ICD-10-CM

## 2025-01-20 DIAGNOSIS — N30.01 ACUTE CYSTITIS WITH HEMATURIA: Primary | ICD-10-CM

## 2025-01-20 DIAGNOSIS — B96.89 BACTERIAL VAGINOSIS: ICD-10-CM

## 2025-01-20 LAB
ALBUMIN UR-MCNC: 30 MG/DL
APPEARANCE UR: ABNORMAL
BACTERIA #/AREA URNS HPF: ABNORMAL /HPF
BILIRUB UR QL STRIP: NEGATIVE
CLUE CELLS: PRESENT
COLOR UR AUTO: YELLOW
GLUCOSE UR STRIP-MCNC: NEGATIVE MG/DL
HGB UR QL STRIP: ABNORMAL
KETONES UR STRIP-MCNC: NEGATIVE MG/DL
LEUKOCYTE ESTERASE UR QL STRIP: ABNORMAL
NITRATE UR QL: POSITIVE
PH UR STRIP: 7 [PH] (ref 5–7)
RBC #/AREA URNS AUTO: ABNORMAL /HPF
SP GR UR STRIP: 1.02 (ref 1–1.03)
SQUAMOUS #/AREA URNS AUTO: ABNORMAL /LPF
TRICHOMONAS, WET PREP: ABNORMAL
UROBILINOGEN UR STRIP-ACNC: 0.2 E.U./DL
WBC #/AREA URNS AUTO: >100 /HPF
WBC CLUMPS #/AREA URNS HPF: PRESENT /HPF
WBC'S/HIGH POWER FIELD, WET PREP: ABNORMAL
YEAST, WET PREP: ABNORMAL

## 2025-01-20 PROCEDURE — 87210 SMEAR WET MOUNT SALINE/INK: CPT | Performed by: PHYSICIAN ASSISTANT

## 2025-01-20 PROCEDURE — 87186 SC STD MICRODIL/AGAR DIL: CPT | Performed by: PHYSICIAN ASSISTANT

## 2025-01-20 PROCEDURE — G2211 COMPLEX E/M VISIT ADD ON: HCPCS | Performed by: PHYSICIAN ASSISTANT

## 2025-01-20 PROCEDURE — 87086 URINE CULTURE/COLONY COUNT: CPT | Performed by: PHYSICIAN ASSISTANT

## 2025-01-20 PROCEDURE — 81001 URINALYSIS AUTO W/SCOPE: CPT | Performed by: PHYSICIAN ASSISTANT

## 2025-01-20 PROCEDURE — 99214 OFFICE O/P EST MOD 30 MIN: CPT | Performed by: PHYSICIAN ASSISTANT

## 2025-01-20 PROCEDURE — 87591 N.GONORRHOEAE DNA AMP PROB: CPT | Performed by: PHYSICIAN ASSISTANT

## 2025-01-20 PROCEDURE — 87491 CHLMYD TRACH DNA AMP PROBE: CPT | Performed by: PHYSICIAN ASSISTANT

## 2025-01-20 RX ORDER — SULFAMETHOXAZOLE AND TRIMETHOPRIM 800; 160 MG/1; MG/1
1 TABLET ORAL 2 TIMES DAILY
Qty: 6 TABLET | Refills: 0 | Status: SHIPPED | OUTPATIENT
Start: 2025-01-20

## 2025-01-20 RX ORDER — METRONIDAZOLE 500 MG/1
500 TABLET ORAL 2 TIMES DAILY
Qty: 14 TABLET | Refills: 0 | Status: SHIPPED | OUTPATIENT
Start: 2025-01-20 | End: 2025-01-27

## 2025-01-20 NOTE — PROGRESS NOTES
Assessment & Plan     Acute cystitis with hematuria  Dysuria  Will treat with Bactrim. Culture pending. I discussed with the patient risks and benefits of the new medication prescribed including potential side effects.  The patient had opportunity to ask questions and is comfortable with and interested in medications as prescribed.   - UA Macroscopic with reflex to Microscopic and Culture - Lab Collect  - Urine Microscopic Exam  - Urine Culture  - sulfamethoxazole-trimethoprim (BACTRIM DS) 800-160 MG tablet; Take 1 tablet by mouth 2 times daily.    Bacterial vaginosis  Vaginal discharge  History of recurrent BV. Will treat. I discussed with the patient risks and benefits of the new medication prescribed including potential side effects.  The patient had opportunity to ask questions and is comfortable with and interested in medications as prescribed.   - Wet prep - Clinic Collect  - metroNIDAZOLE (FLAGYL) 500 MG tablet; Take 1 tablet (500 mg) by mouth 2 times daily for 7 days.    Screen for STD (sexually transmitted disease)  Screen.  - Neisseria gonorrhoeae PCR  - Chlamydia trachomatis PCR        The longitudinal plan of care for the diagnosis(es)/condition(s) as documented were addressed during this visit. Due to the added complexity in care, I will continue to support Jamee in the subsequent management and with ongoing continuity of care.          Subjective   Jamee is a 24 year old, presenting for the following health issues:  Urinary Problem (Urinary pain x 3 days) and Vaginal Problem (Odor )      1/20/2025     3:01 PM   Additional Questions   Roomed by An V.         1/20/2025     3:01 PM   Patient Reported Additional Medications   Patient reports taking the following new medications none     Vaginal Problem     History of Present Illness       Reason for visit:  Uti  Symptom onset:  1-3 days ago  Symptoms include:  Stomach pain  Symptom intensity:  Mild  Symptom progression:  Staying the same  Had these  "symptoms before:  Yes  Has tried/received treatment for these symptoms:  Yes  Previous treatment was successful:  Yes  Prior treatment description:  Medication  What makes it worse:  No  What makes it better:  No   She is taking medications regularly.         Genitourinary - Female  Onset/Duration: 3 days   Description:   Painful urination (Dysuria): YES           Frequency: No  Blood in urine (Hematuria): YES  Delay in urine (Hesitency): YES  Intensity: mild  Progression of Symptoms:  same  Accompanying Signs & Symptoms:  Fever/chills: No  Flank pain: No  Nausea and vomiting: No  Vaginal symptoms: odor  Abdominal/Pelvic Pain: YES  History:   History of frequent UTI s: No  History of kidney stones: No  Sexually Active: YES  Possibility of pregnancy: No  Precipitating or alleviating factors: None  Therapies tried and outcome: none        Review of Systems  Constitutional, HEENT, cardiovascular, pulmonary, gi and gu systems are negative, except as otherwise noted.      Objective    /75   Pulse 85   Temp 97.5  F (36.4  C) (Temporal)   Resp 16   Ht 1.642 m (5' 4.65\")   Wt 56.8 kg (125 lb 3.2 oz)   LMP 01/05/2025 (Approximate)   SpO2 100%   BMI 21.06 kg/m    Body mass index is 21.06 kg/m .  Physical Exam   GENERAL: alert and no distress            Signed Electronically by: Jazzmine Thompson PA-C    "

## 2025-01-21 LAB
BACTERIA UR CULT: ABNORMAL
C TRACH DNA SPEC QL NAA+PROBE: NEGATIVE
N GONORRHOEA DNA SPEC QL NAA+PROBE: NEGATIVE
SPECIMEN TYPE: NORMAL
SPECIMEN TYPE: NORMAL

## 2025-03-25 ENCOUNTER — OFFICE VISIT (OUTPATIENT)
Dept: FAMILY MEDICINE | Facility: CLINIC | Age: 25
End: 2025-03-25
Payer: COMMERCIAL

## 2025-03-25 VITALS
HEART RATE: 70 BPM | WEIGHT: 122 LBS | RESPIRATION RATE: 12 BRPM | OXYGEN SATURATION: 100 % | HEIGHT: 65 IN | DIASTOLIC BLOOD PRESSURE: 79 MMHG | SYSTOLIC BLOOD PRESSURE: 118 MMHG | BODY MASS INDEX: 20.33 KG/M2 | TEMPERATURE: 97.8 F

## 2025-03-25 DIAGNOSIS — R35.0 URINE FREQUENCY: ICD-10-CM

## 2025-03-25 DIAGNOSIS — Z87.440 PERSONAL HISTORY OF URINARY TRACT INFECTION: ICD-10-CM

## 2025-03-25 DIAGNOSIS — N89.8 VAGINAL DISCHARGE: Primary | ICD-10-CM

## 2025-03-25 LAB
ALBUMIN UR-MCNC: NEGATIVE MG/DL
APPEARANCE UR: CLEAR
BILIRUB UR QL STRIP: NEGATIVE
CLUE CELLS: ABNORMAL
COLOR UR AUTO: YELLOW
GLUCOSE UR STRIP-MCNC: NEGATIVE MG/DL
HGB UR QL STRIP: NEGATIVE
KETONES UR STRIP-MCNC: NEGATIVE MG/DL
LEUKOCYTE ESTERASE UR QL STRIP: NEGATIVE
NITRATE UR QL: NEGATIVE
PH UR STRIP: 5.5 [PH] (ref 5–8)
SP GR UR STRIP: 1.02 (ref 1–1.03)
TRICHOMONAS, WET PREP: ABNORMAL
UROBILINOGEN UR STRIP-ACNC: 0.2 E.U./DL
WBC'S/HIGH POWER FIELD, WET PREP: ABNORMAL
YEAST, WET PREP: ABNORMAL

## 2025-03-25 PROCEDURE — 99213 OFFICE O/P EST LOW 20 MIN: CPT | Performed by: NURSE PRACTITIONER

## 2025-03-25 PROCEDURE — 3078F DIAST BP <80 MM HG: CPT | Performed by: NURSE PRACTITIONER

## 2025-03-25 PROCEDURE — 87210 SMEAR WET MOUNT SALINE/INK: CPT | Performed by: NURSE PRACTITIONER

## 2025-03-25 PROCEDURE — 3074F SYST BP LT 130 MM HG: CPT | Performed by: NURSE PRACTITIONER

## 2025-03-25 PROCEDURE — 81003 URINALYSIS AUTO W/O SCOPE: CPT | Performed by: NURSE PRACTITIONER

## 2025-03-25 NOTE — RESULT ENCOUNTER NOTE
Hi    It was good to have met you!    - normal lab values. No sign of infection in bladder or vagina area. Good news!!     GALLO Michael CNP

## 2025-03-25 NOTE — PROGRESS NOTES
"  Assessment & Plan     Vaginal discharge  **  - Wet prep - Clinic Collect    Urine frequency  **    Personal history of urinary tract infection  **  - UA Macroscopic with reflex to Microscopic and Culture - Clinic Collect    - normal lab values. No sign of infection in bladder or vagina area. Good news!!           Subjective   Jamee is a 24 year old, presenting for the following health issues:  UTI and Vaginal Problem      3/25/2025     3:20 PM   Additional Questions   Roomed by Kvng     - wonders if she has BACTERIAL VAGINOSIS. Noticing some discharge. No fever or chills. No flank pain. No abdomen pains. No odor. No std concerns.   - did get treated for UTI but still has mild symptoms. Wants to make sure UTI is gone.     UTI    Vaginal Problem     History of Present Illness       Reason for visit:  BV/yeast/UTI                       Objective    /79   Pulse 70   Temp 97.8  F (36.6  C) (Oral)   Resp 12   Ht 1.638 m (5' 4.5\")   Wt 55.3 kg (122 lb)   LMP 03/06/2025 (Exact Date)   SpO2 100%   BMI 20.62 kg/m    Body mass index is 20.62 kg/m .  Physical Exam   GENERAL: alert and no distress  ABDOMEN: soft, nontender, no hepatosplenomegaly, no masses and bowel sounds normal    Results for orders placed or performed in visit on 03/25/25   UA Macroscopic with reflex to Microscopic and Culture - Clinic Collect     Status: Normal    Specimen: Urine, Clean Catch   Result Value Ref Range    Color Urine Yellow Colorless, Straw, Light Yellow, Yellow    Appearance Urine Clear Clear    Glucose Urine Negative Negative mg/dL    Bilirubin Urine Negative Negative    Ketones Urine Negative Negative mg/dL    Specific Gravity Urine 1.025 1.005 - 1.030    Blood Urine Negative Negative    pH Urine 5.5 5.0 - 8.0    Protein Albumin Urine Negative Negative mg/dL    Urobilinogen Urine 0.2 0.2, 1.0 E.U./dL    Nitrite Urine Negative Negative    Leukocyte Esterase Urine Negative Negative    Narrative    Microscopic not indicated "   Wet prep - Clinic Collect     Status: Abnormal    Specimen: Vagina; Swab   Result Value Ref Range    Trichomonas Absent Absent    Yeast Absent Absent    Clue Cells Absent Absent    WBCs/high power field 1+ (A) None     Results for orders placed or performed in visit on 03/25/25 (from the past 24 hours)   Wet prep - Clinic Collect    Specimen: Vagina; Swab   Result Value Ref Range    Trichomonas Absent Absent    Yeast Absent Absent    Clue Cells Absent Absent    WBCs/high power field 1+ (A) None   UA Macroscopic with reflex to Microscopic and Culture - Clinic Collect    Specimen: Urine, Clean Catch   Result Value Ref Range    Color Urine Yellow Colorless, Straw, Light Yellow, Yellow    Appearance Urine Clear Clear    Glucose Urine Negative Negative mg/dL    Bilirubin Urine Negative Negative    Ketones Urine Negative Negative mg/dL    Specific Gravity Urine 1.025 1.005 - 1.030    Blood Urine Negative Negative    pH Urine 5.5 5.0 - 8.0    Protein Albumin Urine Negative Negative mg/dL    Urobilinogen Urine 0.2 0.2, 1.0 E.U./dL    Nitrite Urine Negative Negative    Leukocyte Esterase Urine Negative Negative    Narrative    Microscopic not indicated           Signed Electronically by: GALLO Michael CNP

## 2025-04-06 ENCOUNTER — HEALTH MAINTENANCE LETTER (OUTPATIENT)
Age: 25
End: 2025-04-06

## 2025-05-07 ENCOUNTER — RESULTS FOLLOW-UP (OUTPATIENT)
Dept: DERMATOLOGY | Facility: CLINIC | Age: 25
End: 2025-05-07